# Patient Record
Sex: MALE | Race: WHITE | Employment: FULL TIME | URBAN - METROPOLITAN AREA
[De-identification: names, ages, dates, MRNs, and addresses within clinical notes are randomized per-mention and may not be internally consistent; named-entity substitution may affect disease eponyms.]

---

## 2017-01-17 ENCOUNTER — OFFICE VISIT (OUTPATIENT)
Dept: INTERNAL MEDICINE CLINIC | Age: 52
End: 2017-01-17

## 2017-01-17 VITALS
BODY MASS INDEX: 34.24 KG/M2 | HEART RATE: 60 BPM | DIASTOLIC BLOOD PRESSURE: 75 MMHG | WEIGHT: 252.8 LBS | HEIGHT: 72 IN | TEMPERATURE: 98.5 F | SYSTOLIC BLOOD PRESSURE: 104 MMHG

## 2017-01-17 DIAGNOSIS — Z00.00 PREVENTATIVE HEALTH CARE: Primary | ICD-10-CM

## 2017-01-17 DIAGNOSIS — F33.1 MODERATE EPISODE OF RECURRENT MAJOR DEPRESSIVE DISORDER (HCC): ICD-10-CM

## 2017-01-17 DIAGNOSIS — E66.09 NON MORBID OBESITY DUE TO EXCESS CALORIES: ICD-10-CM

## 2017-01-17 DIAGNOSIS — Z00.00 PREVENTATIVE HEALTH CARE: ICD-10-CM

## 2017-01-17 LAB
CHOLESTEROL, TOTAL: 199 MG/DL (ref 0–199)
HDLC SERPL-MCNC: 34 MG/DL (ref 40–60)
HEPATITIS C ANTIBODY INTERPRETATION: NORMAL
LDL CHOLESTEROL CALCULATED: 144 MG/DL
T4 FREE: 1.2 NG/DL (ref 0.9–1.8)
TRIGL SERPL-MCNC: 106 MG/DL (ref 0–150)
TSH SERPL DL<=0.05 MIU/L-ACNC: 2.22 UIU/ML (ref 0.27–4.2)
VLDLC SERPL CALC-MCNC: 21 MG/DL

## 2017-01-17 PROCEDURE — 99386 PREV VISIT NEW AGE 40-64: CPT | Performed by: INTERNAL MEDICINE

## 2017-01-17 RX ORDER — CALCIUM CARBONATE 200(500)MG
1 TABLET,CHEWABLE ORAL 2 TIMES DAILY WITH MEALS
Qty: 180 TABLET | Refills: 5 | Status: SHIPPED | OUTPATIENT
Start: 2017-01-17 | End: 2018-02-14 | Stop reason: SDUPTHER

## 2017-01-17 RX ORDER — ALPRAZOLAM 1 MG/1
1 TABLET ORAL NIGHTLY PRN
Qty: 25 TABLET | Refills: 0 | Status: SHIPPED | OUTPATIENT
Start: 2017-01-17 | End: 2018-10-30

## 2017-01-17 ASSESSMENT — ENCOUNTER SYMPTOMS
RESPIRATORY NEGATIVE: 1
GASTROINTESTINAL NEGATIVE: 1
EYES NEGATIVE: 1
ALLERGIC/IMMUNOLOGIC NEGATIVE: 1

## 2017-01-18 LAB
ESTIMATED AVERAGE GLUCOSE: 108.3 MG/DL
HBA1C MFR BLD: 5.4 %

## 2017-02-02 ENCOUNTER — TELEPHONE (OUTPATIENT)
Dept: INTERNAL MEDICINE CLINIC | Age: 52
End: 2017-02-02

## 2017-02-08 ENCOUNTER — OFFICE VISIT (OUTPATIENT)
Dept: INTERNAL MEDICINE CLINIC | Age: 52
End: 2017-02-08

## 2017-02-08 VITALS — HEART RATE: 56 BPM | DIASTOLIC BLOOD PRESSURE: 66 MMHG | TEMPERATURE: 98 F | SYSTOLIC BLOOD PRESSURE: 122 MMHG

## 2017-02-08 DIAGNOSIS — H91.21 SUDDEN HEARING LOSS, RIGHT: ICD-10-CM

## 2017-02-08 DIAGNOSIS — Z00.00 PREVENTATIVE HEALTH CARE: ICD-10-CM

## 2017-02-08 DIAGNOSIS — S03.00XA TMJ (DISLOCATION OF TEMPOROMANDIBULAR JOINT), INITIAL ENCOUNTER: Primary | ICD-10-CM

## 2017-02-08 PROCEDURE — 99213 OFFICE O/P EST LOW 20 MIN: CPT | Performed by: INTERNAL MEDICINE

## 2017-02-08 RX ORDER — PREDNISONE 20 MG/1
60 TABLET ORAL DAILY
Qty: 30 TABLET | Refills: 0 | Status: SHIPPED | OUTPATIENT
Start: 2017-02-08 | End: 2017-02-18

## 2017-02-08 RX ORDER — CYCLOBENZAPRINE HCL 10 MG
10 TABLET ORAL NIGHTLY
Qty: 10 TABLET | Refills: 0 | Status: SHIPPED | OUTPATIENT
Start: 2017-02-08 | End: 2017-02-18

## 2017-02-08 RX ORDER — IBUPROFEN 800 MG/1
800 TABLET ORAL EVERY 8 HOURS PRN
Qty: 60 TABLET | Refills: 1 | Status: SHIPPED | OUTPATIENT
Start: 2017-02-08 | End: 2017-04-21 | Stop reason: SDUPTHER

## 2017-02-08 ASSESSMENT — ENCOUNTER SYMPTOMS
ABDOMINAL PAIN: 0
DIARRHEA: 0
VOMITING: 0
SORE THROAT: 0
COUGH: 0
RHINORRHEA: 0

## 2017-02-13 ENCOUNTER — NURSE ONLY (OUTPATIENT)
Dept: INTERNAL MEDICINE CLINIC | Age: 52
End: 2017-02-13

## 2017-02-13 DIAGNOSIS — Z00.00 PREVENTATIVE HEALTH CARE: ICD-10-CM

## 2017-02-13 LAB
CONTROL: NORMAL
HEMOCCULT STL QL: NORMAL

## 2017-02-13 PROCEDURE — 82274 ASSAY TEST FOR BLOOD FECAL: CPT | Performed by: INTERNAL MEDICINE

## 2017-04-24 RX ORDER — IBUPROFEN 800 MG/1
TABLET ORAL
Qty: 60 TABLET | Refills: 1 | Status: SHIPPED | OUTPATIENT
Start: 2017-04-24 | End: 2021-12-09

## 2018-01-15 RX ORDER — MELATONIN
1000 DAILY
Qty: 90 TABLET | Refills: 3 | Status: SHIPPED | OUTPATIENT
Start: 2018-01-15 | End: 2018-12-10 | Stop reason: SDUPTHER

## 2018-02-14 RX ORDER — ANTACID TABLETS 500 MG/1
TABLET, CHEWABLE ORAL
Qty: 180 TABLET | Refills: 4 | Status: ON HOLD | OUTPATIENT
Start: 2018-02-14 | End: 2019-02-01

## 2018-08-21 ENCOUNTER — OFFICE VISIT (OUTPATIENT)
Dept: INTERNAL MEDICINE CLINIC | Age: 53
End: 2018-08-21

## 2018-08-21 VITALS
OXYGEN SATURATION: 100 % | HEIGHT: 72 IN | SYSTOLIC BLOOD PRESSURE: 131 MMHG | WEIGHT: 219.4 LBS | BODY MASS INDEX: 29.72 KG/M2 | HEART RATE: 90 BPM | DIASTOLIC BLOOD PRESSURE: 88 MMHG

## 2018-08-21 DIAGNOSIS — M54.12 CERVICAL RADICULOPATHY: Primary | ICD-10-CM

## 2018-08-21 PROCEDURE — 99213 OFFICE O/P EST LOW 20 MIN: CPT | Performed by: NURSE PRACTITIONER

## 2018-08-21 RX ORDER — METHYLPREDNISOLONE 4 MG/1
TABLET ORAL
Qty: 1 KIT | Refills: 0 | Status: SHIPPED | OUTPATIENT
Start: 2018-08-21 | End: 2018-08-27

## 2018-08-21 RX ORDER — CYCLOBENZAPRINE HCL 10 MG
10 TABLET ORAL 3 TIMES DAILY PRN
Qty: 30 TABLET | Refills: 0 | Status: SHIPPED | OUTPATIENT
Start: 2018-08-21 | End: 2018-08-31

## 2018-08-21 ASSESSMENT — ENCOUNTER SYMPTOMS: BACK PAIN: 1

## 2018-08-21 NOTE — PROGRESS NOTES
Subjective:      Patient ID: Sukh Olson is a 46 y.o. male. HPI 45 yo male male presents to clinic for c/o possible pinched nerve to right shoulder. Has tried advil 400 mg  and heating pad with no relief. Pain with ROM. Pain also at rest. Rates pain 3-4/10 at rest. Has job that involves a lot of overhead lifting and motions. Has not noted any swelling. Nothing makes It better. worse with movement    . Patient past medical history, family history, social, and smoking reviewed and updated as pertinent. Health maintenance has been reviewed. Prior to Visit Medications    Medication Sig Taking? Authorizing Provider   methylPREDNISolone (MEDROL DOSEPACK) 4 MG tablet Take by mouth. Yes KARINA Marcano CNP   cyclobenzaprine (FLEXERIL) 10 MG tablet Take 1 tablet by mouth 3 times daily as needed for Muscle spasms Yes KARINA Marcano CNP   MARIUSZ-GEST ANTACID 500 MG chewable tablet CHEW 1 TABLET WITH MEALS TWICE A DAY Yes Roberto Gifford MD   Cholecalciferol (VITAMIN D3) 1000 units TABS TAKE 1 TABLET BY MOUTH DAILY Yes Roberto Gifford MD   ibuprofen (ADVIL;MOTRIN) 800 MG tablet TAKE 1 TABLET BY MOUTH EVERY 8 HOURS AS NEEDED FOR PAIN TAKE WITH FOOD. Roberto Gifford MD   ALPRAZolam Royetta Field) 1 MG tablet Take 1 tablet by mouth nightly as needed for Sleep  Roberto Gifford MD   clonazePAM (KLONOPIN) 0.5 MG tablet Take 1 tablet by mouth 2 times daily as needed for Anxiety. Brisa Paul MD         Review of Systems   Musculoskeletal: Positive for back pain and neck pain. All other systems reviewed and are negative. Objective:   Physical Exam   Constitutional: He is oriented to person, place, and time. He appears well-developed and well-nourished. Eyes: Pupils are equal, round, and reactive to light. Cardiovascular: Normal rate, regular rhythm and normal heart sounds.     Pulmonary/Chest: Effort normal and breath sounds normal.   Neurological: He is alert and oriented to person, place, and

## 2018-10-30 ENCOUNTER — OFFICE VISIT (OUTPATIENT)
Dept: INTERNAL MEDICINE CLINIC | Age: 53
End: 2018-10-30
Payer: COMMERCIAL

## 2018-10-30 VITALS
HEART RATE: 88 BPM | BODY MASS INDEX: 29.4 KG/M2 | HEIGHT: 71 IN | SYSTOLIC BLOOD PRESSURE: 100 MMHG | WEIGHT: 210 LBS | RESPIRATION RATE: 16 BRPM | DIASTOLIC BLOOD PRESSURE: 60 MMHG

## 2018-10-30 DIAGNOSIS — Z23 NEED FOR PROPHYLACTIC VACCINATION AGAINST DIPHTHERIA-TETANUS-PERTUSSIS (DTP): ICD-10-CM

## 2018-10-30 DIAGNOSIS — Z12.11 SCREENING FOR COLON CANCER: ICD-10-CM

## 2018-10-30 DIAGNOSIS — E66.3 OVERWEIGHT (BMI 25.0-29.9): ICD-10-CM

## 2018-10-30 DIAGNOSIS — F41.8 OTHER SPECIFIED ANXIETY DISORDERS: Primary | ICD-10-CM

## 2018-10-30 DIAGNOSIS — E78.5 DYSLIPIDEMIA (HIGH LDL; LOW HDL): ICD-10-CM

## 2018-10-30 DIAGNOSIS — Z23 NEED FOR PROPHYLACTIC VACCINATION AND INOCULATION AGAINST VARICELLA: ICD-10-CM

## 2018-10-30 PROBLEM — E66.09 NON MORBID OBESITY DUE TO EXCESS CALORIES: Status: RESOLVED | Noted: 2017-01-17 | Resolved: 2018-10-30

## 2018-10-30 PROCEDURE — 99214 OFFICE O/P EST MOD 30 MIN: CPT | Performed by: INTERNAL MEDICINE

## 2018-10-30 ASSESSMENT — PATIENT HEALTH QUESTIONNAIRE - PHQ9
SUM OF ALL RESPONSES TO PHQ9 QUESTIONS 1 & 2: 0
SUM OF ALL RESPONSES TO PHQ QUESTIONS 1-9: 0
SUM OF ALL RESPONSES TO PHQ QUESTIONS 1-9: 0
2. FEELING DOWN, DEPRESSED OR HOPELESS: 0
1. LITTLE INTEREST OR PLEASURE IN DOING THINGS: 0

## 2018-10-30 NOTE — PATIENT INSTRUCTIONS
social groups, or volunteer to help others. Being alone sometimes makes things seem worse than they are. · Get at least 30 minutes of exercise on most days of the week to relieve stress. Walking is a good choice. You also may want to do other activities, such as running, swimming, cycling, or playing tennis or team sports. Relaxation techniques  Do relaxation exercises 10 to 20 minutes a day. You can play soothing, relaxing music while you do them, if you wish. · Tell others in your house that you are going to do your relaxation exercises. Ask them not to disturb you. · Find a comfortable place, away from all distractions and noise. · Lie down on your back, or sit with your back straight. · Focus on your breathing. Make it slow and steady. · Breathe in through your nose. Breathe out through either your nose or mouth. · Breathe deeply, filling up the area between your navel and your rib cage. Breathe so that your belly goes up and down. · Do not hold your breath. · Breathe like this for 5 to 10 minutes. Notice the feeling of calmness throughout your whole body. As you continue to breathe slowly and deeply, relax by doing the following for another 5 to 10 minutes:  · Tighten and relax each muscle group in your body. You can begin at your toes and work your way up to your head. · Imagine your muscle groups relaxing and becoming heavy. · Empty your mind of all thoughts. · Let yourself relax more and more deeply. · Become aware of the state of calmness that surrounds you. · When your relaxation time is over, you can bring yourself back to alertness by moving your fingers and toes and then your hands and feet and then stretching and moving your entire body. Sometimes people fall asleep during relaxation, but they usually wake up shortly afterward. · Always give yourself time to return to full alertness before you drive a car or do anything that might cause an accident if you are not fully alert.  Never play a relaxation tape while you drive a car. When should you call for help? Call 911 anytime you think you may need emergency care. For example, call if:    · You feel you cannot stop from hurting yourself or someone else.   Katie Moeller the numbers for these national suicide hotlines: 1-362-912-TALK (2-371.787.9800) and 8-946-JKFNVPJ (4-304.461.7842). If you or someone you know talks about suicide or feeling hopeless, get help right away.   Watch closely for changes in your health, and be sure to contact your doctor if:    · You have anxiety or fear that affects your life.     · You have symptoms of anxiety that are new or different from those you had before. Where can you learn more? Go to https://[email or web address]. org and sign in to your [employer] account. Enter P754 in the eROI box to learn more about \"Anxiety Disorder: Care Instructions. \"     If you do not have an account, please click on the \"Sign Up Now\" link. Current as of: December 7, 2017  Content Version: 11.7  © 7470-1236 U[employer].. Care instructions adapted under license by [employer] ([facility]). If you have questions about a medical condition or this instruction, always ask your healthcare professional. [address] any warranty or liability for your use of this information. Patient Education            Current as of: March 28, 2018  Content Version: 11.7  © 9437-3214 UB.. Care instructions adapted under license by [employer] ([facility]). If you have questions about a medical condition or this instruction, always ask your healthcare professional. [address] any warranty or liability for your use of this information. Patient Education        Colon Cancer Screening: Care Instructions  Your Care Instructions    Colorectal cancer occurs in the colon or rectum. That's the lower part of your digestive system.  It is the second-leading cause of cancer deaths

## 2018-10-30 NOTE — PROGRESS NOTES
PROGRESS NOTE:    Rozina Pickard    10/30/2018    Chief Complaint   Patient presents with   Zena Vincent Patient     Former Dr. Arjun Marti patient   [facility] Maintenance     Patient advised of need for flu vaccine, declines previous allergic reaction to vaccine. HPI:    Mr(s)Octaviano Pickard presents to clinic today with issues noted above. He does have some anxiety but no more than normal reaction to common stressors such as finances (i.e. Buying a condo\". Been taking vitamin D, he does not know why he is taking it. Also placed on Tums twice daily by Arjun Marti, he does not know why he is taking it. Patient denies chest pain, SOB, NVD, FC, rash, malaise, rigor, dizziness/lightheadness, other pertinent ROS was also reviewed. /60 (Site: Right Upper Arm, Position: Sitting, Cuff Size: Large Adult)   Pulse 88 Comment: Regular  Resp 16   Ht 5' 11\" (1.803 m)   Wt 210 lb (95.3 kg)   BMI 29.29 kg/m²   Body mass index is 29.29 kg/m². No Known Allergies    Physical Exam:    Gen: Patient appears well groomed, well appearing  HEAD: Atraumatic, normocephalic,   Eyes: PERRLA, EOMI   Neck: supple, no thyroid nodule appreciated, no JVD  Chest: Clear to auscultation SENIA, unlabored breathing, normal expansion  Heart: Regular rate, regular rhythm, no murmur, no rub  Abdomen: Non-tender, non-distended, bowel sounds present x3  Extremities: no edema, distal pulses intact  Patient was alert and oriented to person, place and time  Psych:     Assessment and Plan:    Paco Reed was seen today for new patient and health maintenance. Diagnoses and all orders for this visit:    Other specified anxiety disorders  He is doing well overall without meds, interested in talking with psychology    Overweight (BMI 25.0-29. 9)  He is currently on keto diet, I advised him to keep a balanced diet instead, has lost >25lbs, continue diet/exercise mods  -     Lipid, Fasting;  Future    Dyslipidemia (high LDL; low

## 2018-12-10 RX ORDER — MELATONIN
1000 DAILY
Qty: 90 TABLET | Refills: 1 | Status: SHIPPED | OUTPATIENT
Start: 2018-12-10 | End: 2019-03-08 | Stop reason: SDUPTHER

## 2019-01-14 ENCOUNTER — TELEPHONE (OUTPATIENT)
Dept: SURGERY | Age: 54
End: 2019-01-14

## 2019-01-15 ENCOUNTER — ANESTHESIA EVENT (OUTPATIENT)
Dept: ENDOSCOPY | Age: 54
End: 2019-01-15
Payer: COMMERCIAL

## 2019-01-21 ENCOUNTER — OFFICE VISIT (OUTPATIENT)
Dept: INTERNAL MEDICINE CLINIC | Age: 54
End: 2019-01-21
Payer: COMMERCIAL

## 2019-01-21 VITALS
DIASTOLIC BLOOD PRESSURE: 60 MMHG | SYSTOLIC BLOOD PRESSURE: 110 MMHG | HEIGHT: 72 IN | WEIGHT: 205 LBS | TEMPERATURE: 98.3 F | RESPIRATION RATE: 16 BRPM | BODY MASS INDEX: 27.77 KG/M2 | HEART RATE: 68 BPM

## 2019-01-21 DIAGNOSIS — J00 NASOPHARYNGITIS: Primary | ICD-10-CM

## 2019-01-21 DIAGNOSIS — H65.01 RIGHT ACUTE SEROUS OTITIS MEDIA, RECURRENCE NOT SPECIFIED: ICD-10-CM

## 2019-01-21 PROCEDURE — 99213 OFFICE O/P EST LOW 20 MIN: CPT | Performed by: INTERNAL MEDICINE

## 2019-01-21 RX ORDER — PREDNISONE 10 MG/1
20 TABLET ORAL DAILY
Qty: 10 TABLET | Refills: 0 | Status: SHIPPED | OUTPATIENT
Start: 2019-01-21 | End: 2019-01-26

## 2019-01-21 RX ORDER — PROMETHAZINE HYDROCHLORIDE AND PHENYLEPHRINE HYDROCHLORIDE 6.25; 5 MG/5ML; MG/5ML
5 SYRUP ORAL EVERY 6 HOURS
Qty: 280 ML | Refills: 0 | Status: SHIPPED | OUTPATIENT
Start: 2019-01-21 | End: 2019-11-11 | Stop reason: ALTCHOICE

## 2019-01-21 RX ORDER — CETIRIZINE HYDROCHLORIDE 10 MG/1
10 TABLET ORAL DAILY
Qty: 30 TABLET | Refills: 0 | Status: SHIPPED | OUTPATIENT
Start: 2019-01-21 | End: 2019-02-17 | Stop reason: SDUPTHER

## 2019-01-24 ENCOUNTER — TELEPHONE (OUTPATIENT)
Dept: INTERNAL MEDICINE CLINIC | Age: 54
End: 2019-01-24

## 2019-01-25 ENCOUNTER — OFFICE VISIT (OUTPATIENT)
Dept: INTERNAL MEDICINE CLINIC | Age: 54
End: 2019-01-25
Payer: COMMERCIAL

## 2019-01-25 VITALS
DIASTOLIC BLOOD PRESSURE: 60 MMHG | OXYGEN SATURATION: 100 % | WEIGHT: 210 LBS | BODY MASS INDEX: 28.48 KG/M2 | SYSTOLIC BLOOD PRESSURE: 115 MMHG | HEART RATE: 80 BPM | TEMPERATURE: 98.4 F

## 2019-01-25 DIAGNOSIS — Z12.11 SCREENING FOR COLON CANCER: Primary | ICD-10-CM

## 2019-01-25 DIAGNOSIS — H66.90 ACUTE OTITIS MEDIA, UNSPECIFIED OTITIS MEDIA TYPE: ICD-10-CM

## 2019-01-25 DIAGNOSIS — J01.00 ACUTE MAXILLARY SINUSITIS, RECURRENCE NOT SPECIFIED: Primary | ICD-10-CM

## 2019-01-25 PROCEDURE — 99213 OFFICE O/P EST LOW 20 MIN: CPT | Performed by: INTERNAL MEDICINE

## 2019-01-25 RX ORDER — AMOXICILLIN AND CLAVULANATE POTASSIUM 875; 125 MG/1; MG/1
1 TABLET, FILM COATED ORAL 2 TIMES DAILY
Qty: 20 TABLET | Refills: 0 | Status: SHIPPED | OUTPATIENT
Start: 2019-01-25 | End: 2019-02-04

## 2019-01-25 RX ORDER — ECHINACEA PURPUREA EXTRACT 125 MG
3 TABLET ORAL 3 TIMES DAILY
Qty: 1 BOTTLE | Refills: 3 | Status: ON HOLD | COMMUNITY
Start: 2019-01-25 | End: 2021-04-20 | Stop reason: HOSPADM

## 2019-01-25 RX ORDER — TRIAMCINOLONE ACETONIDE 55 UG/1
2 SPRAY, METERED NASAL NIGHTLY
Qty: 1 INHALER | Refills: 0 | Status: ON HOLD | COMMUNITY
Start: 2019-01-25 | End: 2021-04-20 | Stop reason: HOSPADM

## 2019-01-29 ENCOUNTER — TELEPHONE (OUTPATIENT)
Dept: SURGERY | Age: 54
End: 2019-01-29

## 2019-01-30 ENCOUNTER — TELEPHONE (OUTPATIENT)
Dept: SURGERY | Age: 54
End: 2019-01-30

## 2019-02-01 ENCOUNTER — HOSPITAL ENCOUNTER (OUTPATIENT)
Age: 54
Setting detail: OUTPATIENT SURGERY
Discharge: HOME | End: 2019-02-01
Attending: SURGERY | Admitting: SURGERY
Payer: COMMERCIAL

## 2019-02-01 ENCOUNTER — ANESTHESIA (OUTPATIENT)
Dept: ENDOSCOPY | Age: 54
End: 2019-02-01
Payer: COMMERCIAL

## 2019-02-01 VITALS
WEIGHT: 205 LBS | HEART RATE: 73 BPM | DIASTOLIC BLOOD PRESSURE: 78 MMHG | BODY MASS INDEX: 27.77 KG/M2 | SYSTOLIC BLOOD PRESSURE: 119 MMHG | OXYGEN SATURATION: 100 % | HEIGHT: 72 IN | RESPIRATION RATE: 14 BRPM | TEMPERATURE: 98.8 F

## 2019-02-01 VITALS
SYSTOLIC BLOOD PRESSURE: 143 MMHG | DIASTOLIC BLOOD PRESSURE: 85 MMHG | RESPIRATION RATE: 18 BRPM | OXYGEN SATURATION: 100 %

## 2019-02-01 PROCEDURE — 2500000003 HC RX 250 WO HCPCS: Performed by: NURSE ANESTHETIST, CERTIFIED REGISTERED

## 2019-02-01 PROCEDURE — 3700000000 HC ANESTHESIA ATTENDED CARE: Performed by: SURGERY

## 2019-02-01 PROCEDURE — 3609027000 HC COLONOSCOPY: Performed by: SURGERY

## 2019-02-01 PROCEDURE — 7100000000 HC PACU RECOVERY - FIRST 15 MIN: Performed by: SURGERY

## 2019-02-01 PROCEDURE — 7100000010 HC PHASE II RECOVERY - FIRST 15 MIN: Performed by: SURGERY

## 2019-02-01 PROCEDURE — 6360000002 HC RX W HCPCS: Performed by: NURSE ANESTHETIST, CERTIFIED REGISTERED

## 2019-02-01 PROCEDURE — 6360000002 HC RX W HCPCS: Performed by: ANESTHESIOLOGY

## 2019-02-01 PROCEDURE — 3700000001 HC ADD 15 MINUTES (ANESTHESIA): Performed by: SURGERY

## 2019-02-01 PROCEDURE — 7100000011 HC PHASE II RECOVERY - ADDTL 15 MIN: Performed by: SURGERY

## 2019-02-01 PROCEDURE — 2580000003 HC RX 258: Performed by: ANESTHESIOLOGY

## 2019-02-01 PROCEDURE — 45378 DIAGNOSTIC COLONOSCOPY: CPT | Performed by: SURGERY

## 2019-02-01 PROCEDURE — 2709999900 HC NON-CHARGEABLE SUPPLY: Performed by: SURGERY

## 2019-02-01 PROCEDURE — 7100000001 HC PACU RECOVERY - ADDTL 15 MIN: Performed by: SURGERY

## 2019-02-01 RX ORDER — SODIUM CHLORIDE 0.9 % (FLUSH) 0.9 %
10 SYRINGE (ML) INJECTION PRN
Status: DISCONTINUED | OUTPATIENT
Start: 2019-02-01 | End: 2019-02-01 | Stop reason: HOSPADM

## 2019-02-01 RX ORDER — LIDOCAINE HYDROCHLORIDE 10 MG/ML
1 INJECTION, SOLUTION EPIDURAL; INFILTRATION; INTRACAUDAL; PERINEURAL
Status: DISCONTINUED | OUTPATIENT
Start: 2019-02-01 | End: 2019-02-01 | Stop reason: HOSPADM

## 2019-02-01 RX ORDER — M-VIT,TX,IRON,MINS/CALC/FOLIC 27MG-0.4MG
1 TABLET ORAL DAILY
COMMUNITY

## 2019-02-01 RX ORDER — SODIUM CHLORIDE 0.9 % (FLUSH) 0.9 %
10 SYRINGE (ML) INJECTION EVERY 12 HOURS SCHEDULED
Status: DISCONTINUED | OUTPATIENT
Start: 2019-02-01 | End: 2019-02-01 | Stop reason: HOSPADM

## 2019-02-01 RX ORDER — DIPHENHYDRAMINE HYDROCHLORIDE 50 MG/ML
12.5 INJECTION INTRAMUSCULAR; INTRAVENOUS ONCE
Status: COMPLETED | OUTPATIENT
Start: 2019-02-01 | End: 2019-02-01

## 2019-02-01 RX ORDER — DIPHENHYDRAMINE HYDROCHLORIDE 50 MG/ML
INJECTION INTRAMUSCULAR; INTRAVENOUS
Status: DISCONTINUED
Start: 2019-02-01 | End: 2019-02-01 | Stop reason: HOSPADM

## 2019-02-01 RX ORDER — LIDOCAINE HYDROCHLORIDE 20 MG/ML
INJECTION, SOLUTION INFILTRATION; PERINEURAL PRN
Status: DISCONTINUED | OUTPATIENT
Start: 2019-02-01 | End: 2019-02-01 | Stop reason: SDUPTHER

## 2019-02-01 RX ORDER — CHLORAL HYDRATE 500 MG
3000 CAPSULE ORAL DAILY
COMMUNITY

## 2019-02-01 RX ORDER — KETAMINE HYDROCHLORIDE 10 MG/ML
INJECTION, SOLUTION INTRAMUSCULAR; INTRAVENOUS PRN
Status: DISCONTINUED | OUTPATIENT
Start: 2019-02-01 | End: 2019-02-01 | Stop reason: SDUPTHER

## 2019-02-01 RX ORDER — LANOLIN ALCOHOL/MO/W.PET/CERES
250 CREAM (GRAM) TOPICAL NIGHTLY
COMMUNITY

## 2019-02-01 RX ORDER — SODIUM CHLORIDE 9 MG/ML
INJECTION, SOLUTION INTRAVENOUS CONTINUOUS
Status: DISCONTINUED | OUTPATIENT
Start: 2019-02-01 | End: 2019-02-01 | Stop reason: HOSPADM

## 2019-02-01 RX ORDER — PROPOFOL 10 MG/ML
INJECTION, EMULSION INTRAVENOUS PRN
Status: DISCONTINUED | OUTPATIENT
Start: 2019-02-01 | End: 2019-02-01 | Stop reason: SDUPTHER

## 2019-02-01 RX ADMIN — LIDOCAINE HYDROCHLORIDE 50 MG: 20 INJECTION, SOLUTION INFILTRATION; PERINEURAL at 08:14

## 2019-02-01 RX ADMIN — SODIUM CHLORIDE: 9 INJECTION, SOLUTION INTRAVENOUS at 06:59

## 2019-02-01 RX ADMIN — PROPOFOL 30 MG: 10 INJECTION, EMULSION INTRAVENOUS at 08:21

## 2019-02-01 RX ADMIN — PROPOFOL 20 MG: 10 INJECTION, EMULSION INTRAVENOUS at 08:17

## 2019-02-01 RX ADMIN — PROPOFOL 30 MG: 10 INJECTION, EMULSION INTRAVENOUS at 08:14

## 2019-02-01 RX ADMIN — KETAMINE HYDROCHLORIDE 15 MG: 10 INJECTION, SOLUTION INTRAMUSCULAR; INTRAVENOUS at 08:18

## 2019-02-01 RX ADMIN — PROPOFOL 20 MG: 10 INJECTION, EMULSION INTRAVENOUS at 08:23

## 2019-02-01 RX ADMIN — PROPOFOL 50 MG: 10 INJECTION, EMULSION INTRAVENOUS at 08:19

## 2019-02-01 RX ADMIN — DIPHENHYDRAMINE HYDROCHLORIDE 12.5 MG: 50 INJECTION, SOLUTION INTRAMUSCULAR; INTRAVENOUS at 08:48

## 2019-02-01 RX ADMIN — KETAMINE HYDROCHLORIDE 15 MG: 10 INJECTION, SOLUTION INTRAMUSCULAR; INTRAVENOUS at 08:14

## 2019-02-01 ASSESSMENT — PULMONARY FUNCTION TESTS
PIF_VALUE: 0

## 2019-02-01 ASSESSMENT — PAIN - FUNCTIONAL ASSESSMENT: PAIN_FUNCTIONAL_ASSESSMENT: 0-10

## 2019-02-19 RX ORDER — CETIRIZINE HYDROCHLORIDE 10 MG/1
TABLET ORAL
Qty: 30 TABLET | Refills: 0 | Status: SHIPPED | OUTPATIENT
Start: 2019-02-19 | End: 2019-03-19 | Stop reason: SDUPTHER

## 2019-03-08 RX ORDER — MELATONIN
1000 DAILY
Qty: 90 TABLET | Refills: 1 | Status: SHIPPED | OUTPATIENT
Start: 2019-03-08 | End: 2019-09-19 | Stop reason: SDUPTHER

## 2019-03-20 RX ORDER — CETIRIZINE HYDROCHLORIDE 10 MG/1
TABLET ORAL
Qty: 90 TABLET | Refills: 0 | Status: ON HOLD | OUTPATIENT
Start: 2019-03-20 | End: 2021-04-20 | Stop reason: HOSPADM

## 2019-09-19 ENCOUNTER — TELEPHONE (OUTPATIENT)
Dept: INTERNAL MEDICINE CLINIC | Age: 54
End: 2019-09-19

## 2019-09-19 RX ORDER — MELATONIN
1000 DAILY
Qty: 90 TABLET | Refills: 0 | Status: SHIPPED | OUTPATIENT
Start: 2019-09-19

## 2019-09-19 NOTE — TELEPHONE ENCOUNTER
Patient requesting refill for Cholecalciferol (VITAMIN D3) 1000 units TABS #90.   Please send to pharmacy: [id number] [facility], [address] [place] [address]

## 2019-09-19 NOTE — TELEPHONE ENCOUNTER
Last appointment: 1/25/19 - urgent visit   Next appointment: 11/11/2019  Last refill: 3/8/19     Please advise as DOD  Thank you

## 2019-11-11 ENCOUNTER — OFFICE VISIT (OUTPATIENT)
Dept: INTERNAL MEDICINE CLINIC | Age: 54
End: 2019-11-11
Payer: COMMERCIAL

## 2019-11-11 VITALS
HEART RATE: 77 BPM | SYSTOLIC BLOOD PRESSURE: 102 MMHG | OXYGEN SATURATION: 99 % | WEIGHT: 212.8 LBS | BODY MASS INDEX: 28.86 KG/M2 | RESPIRATION RATE: 14 BRPM | DIASTOLIC BLOOD PRESSURE: 66 MMHG

## 2019-11-11 DIAGNOSIS — Z13.1 SCREENING FOR DIABETES MELLITUS (DM): ICD-10-CM

## 2019-11-11 DIAGNOSIS — Z13.220 LIPID SCREENING: ICD-10-CM

## 2019-11-11 DIAGNOSIS — Z00.00 ANNUAL PHYSICAL EXAM: Primary | ICD-10-CM

## 2019-11-11 DIAGNOSIS — Z23 NEED FOR TDAP VACCINATION: ICD-10-CM

## 2019-11-11 PROCEDURE — 99396 PREV VISIT EST AGE 40-64: CPT | Performed by: INTERNAL MEDICINE

## 2020-03-30 ENCOUNTER — VIRTUAL VISIT (OUTPATIENT)
Dept: INTERNAL MEDICINE CLINIC | Age: 55
End: 2020-03-30
Payer: COMMERCIAL

## 2020-03-30 ENCOUNTER — TELEPHONE (OUTPATIENT)
Dept: INTERNAL MEDICINE CLINIC | Age: 55
End: 2020-03-30

## 2020-03-30 PROCEDURE — 99214 OFFICE O/P EST MOD 30 MIN: CPT | Performed by: INTERNAL MEDICINE

## 2020-03-30 ASSESSMENT — ENCOUNTER SYMPTOMS
SHORTNESS OF BREATH: 1
DIARRHEA: 0
SORE THROAT: 0
RHINORRHEA: 1
VOMITING: 0
COUGH: 0
NAUSEA: 0
CONSTIPATION: 0
ABDOMINAL PAIN: 1
WHEEZING: 0
SINUS PAIN: 1
SINUS PRESSURE: 1

## 2020-03-31 NOTE — PATIENT INSTRUCTIONS
I have some more up to date information from the [facility] about discontinuing self isolation that is relevant for you. Here is what we are looking for to state that risk of secondary transmission is low -      1. No fever for at least 72 hours without medications  2. Symptoms have improved/resolved  AND  3.  At least 7 days from initial symptom onset
no diarrhea/no change in bowel habits/no hiccoughs/no vomiting/no nausea

## 2020-03-31 NOTE — PROGRESS NOTES
extended release capsule Take 250 mg by mouth nightly  Historical Provider, MD   Multiple Vitamins-Minerals (THERAPEUTIC MULTIVITAMIN-MINERALS) tablet Take 1 tablet by mouth daily  Historical Provider, MD   Omega-3 Fatty Acids (FISH OIL) 1000 MG CAPS Take 3,000 mg by mouth daily  Historical Provider, MD   triamcinolone (NASACORT ALLERGY 24HR) 55 MCG/ACT nasal inhaler 2 sprays by Nasal route nightly  Derrick Olivo DO   sodium chloride (OCEAN) 0.65 % nasal spray 3 sprays by Nasal route three times daily  Derrick Olivo DO   ibuprofen (ADVIL;MOTRIN) 800 MG tablet TAKE 1 TABLET BY MOUTH EVERY 8 HOURS AS NEEDED FOR PAIN TAKE WITH FOOD.   Belinda Oleary MD       Social History     Tobacco Use    Smoking status: Never Smoker    Smokeless tobacco: Never Used    Tobacco comment: non smoker   Substance Use Topics    Alcohol use: No    Drug use: No        No Known Allergies,   Past Medical History:   Diagnosis Date    Anxiety     Chest pain     Depression     PVC (premature ventricular contraction)    ,   Past Surgical History:   Procedure Laterality Date    COLONOSCOPY N/A 2/1/2019    COLONOSCOPY performed by Sita Munoz MD at [address] [place] [address]   ,   Social History     Tobacco Use    Smoking status: Never Smoker    Smokeless tobacco: Never Used    Tobacco comment: non smoker   Substance Use Topics    Alcohol use: No    Drug use: No   ,   Family History   Problem Relation Age of Onset    Breast Cancer Mother     Diabetes Father     Diabetes Maternal Grandmother     Prostate Cancer Neg Hx        PHYSICAL EXAMINATION:  [ INSTRUCTIONS:  \"[x]\" Indicates a positive item  \"[]\" Indicates a negative item  -- DELETE ALL ITEMS NOT EXAMINED]  Vital Signs: (As obtained by patient/caregiver or practitioner observation)    Blood pressure-  Heart rate-    Respiratory rate-    Temperature-  Pulse oximetry-     Constitutional: [x] Appears well-developed and well-nourished [x] No apparent distress      [] Abnormal- Mental status  [x] Alert and awake  [x] Oriented to person/place/time [x]Able to follow commands      Eyes:  EOM    [x]  Normal  [] Abnormal-  Sclera  [x]  Normal  [] Abnormal -         Discharge [x]  None visible  [] Abnormal -    HENT:   [x] Normocephalic, atraumatic. [] Abnormal   [x] Mouth/Throat: Mucous membranes are moist.     External Ears [x] Normal  [] Abnormal-     Neck: [x] No visualized mass     Pulmonary/Chest: [x] Respiratory effort normal.  [x] No visualized signs of difficulty breathing or respiratory distress        [] Abnormal-      Musculoskeletal:   [x] Normal gait with no signs of ataxia         [x] Normal range of motion of neck        [] Abnormal-       Neurological:        [x] No Facial Asymmetry (Cranial nerve 7 motor function) (limited exam to video visit)          [x] No gaze palsy        [] Abnormal-         Skin:        [x] No significant exanthematous lesions or discoloration noted on facial skin         [] Abnormal-             Psychiatric:       [x] Normal Affect [] No Hallucinations        [] Abnormal-     Other pertinent observable physical exam findings-     Due to this being a TeleHealth encounter, evaluation of the following organ systems is limited: Vitals/Constitutional/EENT/Resp/CV/GI//MS/Neuro/Skin/Heme-Lymph-Imm. ASSESSMENT/PLAN:  1. Acute URI  Discussed that we have to treat this as COVID-19 although at this time no way to test or know given his comorbidities. Discussed supportive meds to take and how to gauge when okay to return to work based on risk of secondary transmission (see AVS). Discussed prefer tylenol over ibuprofen. Discussed ER parameters - worsening shortness of breath, worsening LH, inability to tolerate PO.     25 minutes spent with patient- >50 % continuity of care and/or counseling. Return if symptoms worsen or fail to improve. An  electronic signature was used to authenticate this note.     --Lisa Langston MD on 3/30/2020 at 8:02

## 2021-03-22 ENCOUNTER — OFFICE VISIT (OUTPATIENT)
Dept: INTERNAL MEDICINE CLINIC | Age: 56
End: 2021-03-22
Payer: COMMERCIAL

## 2021-03-22 VITALS
BODY MASS INDEX: 28.75 KG/M2 | SYSTOLIC BLOOD PRESSURE: 104 MMHG | TEMPERATURE: 97 F | WEIGHT: 212 LBS | DIASTOLIC BLOOD PRESSURE: 56 MMHG | HEART RATE: 80 BPM

## 2021-03-22 DIAGNOSIS — R06.02 SHORTNESS OF BREATH: ICD-10-CM

## 2021-03-22 DIAGNOSIS — E66.3 OVER WEIGHT: ICD-10-CM

## 2021-03-22 DIAGNOSIS — R00.2 PALPITATIONS: Primary | ICD-10-CM

## 2021-03-22 DIAGNOSIS — R07.89 CHEST PRESSURE: ICD-10-CM

## 2021-03-22 PROCEDURE — 99214 OFFICE O/P EST MOD 30 MIN: CPT | Performed by: INTERNAL MEDICINE

## 2021-03-22 PROCEDURE — 93000 ELECTROCARDIOGRAM COMPLETE: CPT | Performed by: INTERNAL MEDICINE

## 2021-03-22 NOTE — PROGRESS NOTES
Yann Bermudez (:  1965) is a 54 y.o. male,Established patient, here for evaluation of the following chief complaint(s):  Dizziness (Dizzy, light headed, SOB. Symtpoms started a prrox 1 year ago, with exercise. The past week has been more consistant. Feels flutter when laying down)      Vitals:    21 1538   BP: (!) 104/56   Pulse: 80   Temp: 97 °F (36.1 °C)        ASSESSMENT/PLAN:  1. Palpitations  Patient noting palpitations intermittently. This is associated with light headedness, shortness of breath and some chest pressure. EKG showing PVC with normal sinus rhythm. PVCs could be cause of patient feeling palpitations. Will check labs including cmp, cbc and tsh. -     EKG 12 Lead  -     Comprehensive Metabolic Panel; Future  -     CBC Auto Differential; Future  -     TSH with Reflex; Future  -     Hemoglobin A1C; Future  -     Lipid Panel; Future  2. Shortness of breath  Patient with new shortness of breath not only on exertion. Associated with chest pressure and light headedness.   - obtain labs  - ekg showing pvcs, no arrhythmia noted   - will obtain cardiac stress test secondary to patients symptoms.   -     Comprehensive Metabolic Panel; Future  -     CBC Auto Differential; Future  -     TSH with Reflex; Future  -     CARDIAC STRESS TEST EXERCISE ONLY; Future  3. Over weight  Will screen for DM and HLD  -     Hemoglobin A1C; Future  -     Lipid Panel; Future  4. Chest pressure  Please see shortness of breath above  -     CARDIAC STRESS TEST EXERCISE ONLY; Future  5. Light headedness    worsened by position. Possible secondary to othostatic hypotension. BP is on the lower side today in the office.  - educated on calf pumps, compression socks, slow to stand and being liberal with salt. Will also evaluate labs and stress test.     No follow-ups on file.     SUBJECTIVE/OBJECTIVE:  HPI     Patient is a 55 yo fm with pmhx of anxiety and depression who presents secondary to lightheadedness and shortness of breath. Patient states over the last week or so he has been having light headedness with shortness of breath. Noticed this a year ago but only with working out. Now it is all the time. Waxes and wanes throughout the day. He does feels some chest pressure. Does feel palpitations intermittently. Has not lost consciousness. Feels the light headedness most when changing positions from laying to sitting and sitting to standing. Notes he also had some reflux symptoms about one week ago and then following is when he noticed all these symptoms getting worse. Of note patient did have ekg and stress testing in past. Stress test was in 2010 and was normal. I am unable to see ekg and stress test results, can only see mention of them in notes. Review of Systems ROS negative except for those noted in the HPI above. Vitals:    03/22/21 1538   BP: (!) 104/56   Pulse: 80   Temp: 97 °F (36.1 °C)       Physical Exam  Constitutional:       General: He is not in acute distress. Appearance: Normal appearance. He is not ill-appearing. HENT:      Head: Normocephalic and atraumatic. Nose: Nose normal. No congestion or rhinorrhea. Mouth/Throat:      Mouth: Mucous membranes are moist.      Pharynx: No oropharyngeal exudate or posterior oropharyngeal erythema. Eyes:      General: No scleral icterus. Extraocular Movements: Extraocular movements intact. Conjunctiva/sclera: Conjunctivae normal.   Neck:      Musculoskeletal: Normal range of motion. No muscular tenderness. Cardiovascular:      Rate and Rhythm: Normal rate and regular rhythm. Pulses: Normal pulses. Heart sounds: No murmur. No friction rub. No gallop. Pulmonary:      Effort: Pulmonary effort is normal. No respiratory distress. Breath sounds: No wheezing, rhonchi or rales. Abdominal:      General: Bowel sounds are normal. There is no distension. Palpations: Abdomen is soft. Tenderness:  There is no abdominal tenderness. There is no guarding or rebound. Musculoskeletal: Normal range of motion. General: No swelling or tenderness. Lymphadenopathy:      Cervical: No cervical adenopathy. Skin:     General: Skin is warm. Findings: No erythema or rash. Neurological:      General: No focal deficit present. Mental Status: He is alert and oriented to person, place, and time. Psychiatric:         Mood and Affect: Mood normal.         Behavior: Behavior normal.           An electronic signature was used to authenticate this note.     --Ann Avalos, DO

## 2021-04-16 ENCOUNTER — HOSPITAL ENCOUNTER (INPATIENT)
Age: 56
LOS: 4 days | Discharge: HOME | DRG: 274 | End: 2021-04-20
Attending: INTERNAL MEDICINE | Admitting: INTERNAL MEDICINE
Payer: COMMERCIAL

## 2021-04-16 ENCOUNTER — HOSPITAL ENCOUNTER (OUTPATIENT)
Dept: NON INVASIVE DIAGNOSTICS | Age: 56
Discharge: HOME | DRG: 274 | End: 2021-04-16
Payer: COMMERCIAL

## 2021-04-16 ENCOUNTER — TELEPHONE (OUTPATIENT)
Dept: INTERNAL MEDICINE CLINIC | Age: 56
End: 2021-04-16

## 2021-04-16 DIAGNOSIS — R06.02 SHORTNESS OF BREATH: ICD-10-CM

## 2021-04-16 DIAGNOSIS — R07.89 CHEST PRESSURE: ICD-10-CM

## 2021-04-16 PROBLEM — R94.39 ABNORMAL STRESS TEST: Status: ACTIVE | Noted: 2021-04-16

## 2021-04-16 LAB
A/G RATIO: 1.7 (ref 1.1–2.2)
ALBUMIN SERPL-MCNC: 4.3 G/DL (ref 3.4–5)
ALP BLD-CCNC: 54 U/L (ref 40–129)
ALT SERPL-CCNC: 16 U/L (ref 10–40)
ANION GAP SERPL CALCULATED.3IONS-SCNC: 10 MMOL/L (ref 3–16)
AST SERPL-CCNC: 17 U/L (ref 15–37)
BILIRUB SERPL-MCNC: 0.5 MG/DL (ref 0–1)
BUN BLDV-MCNC: 15 MG/DL (ref 7–20)
CALCIUM SERPL-MCNC: 8.9 MG/DL (ref 8.3–10.6)
CHLORIDE BLD-SCNC: 107 MMOL/L (ref 99–110)
CO2: 22 MMOL/L (ref 21–32)
CREAT SERPL-MCNC: 1 MG/DL (ref 0.9–1.3)
GFR AFRICAN AMERICAN: >60
GFR NON-AFRICAN AMERICAN: >60
GLOBULIN: 2.5 G/DL
GLUCOSE BLD-MCNC: 108 MG/DL (ref 70–99)
HCT VFR BLD CALC: 41.3 % (ref 40.5–52.5)
HEMOGLOBIN: 14.2 G/DL (ref 13.5–17.5)
INR BLD: 1.14 (ref 0.86–1.14)
MCH RBC QN AUTO: 31.2 PG (ref 26–34)
MCHC RBC AUTO-ENTMCNC: 34.4 G/DL (ref 31–36)
MCV RBC AUTO: 90.7 FL (ref 80–100)
PDW BLD-RTO: 13.8 % (ref 12.4–15.4)
PLATELET # BLD: 235 K/UL (ref 135–450)
PMV BLD AUTO: 8.6 FL (ref 5–10.5)
POTASSIUM SERPL-SCNC: 4.3 MMOL/L (ref 3.5–5.1)
PROTHROMBIN TIME: 13.3 SEC (ref 10–13.2)
RBC # BLD: 4.55 M/UL (ref 4.2–5.9)
SODIUM BLD-SCNC: 139 MMOL/L (ref 136–145)
TOTAL PROTEIN: 6.8 G/DL (ref 6.4–8.2)
WBC # BLD: 4.4 K/UL (ref 4–11)

## 2021-04-16 PROCEDURE — 6370000000 HC RX 637 (ALT 250 FOR IP)

## 2021-04-16 PROCEDURE — 99153 MOD SED SAME PHYS/QHP EA: CPT

## 2021-04-16 PROCEDURE — 36415 COLL VENOUS BLD VENIPUNCTURE: CPT

## 2021-04-16 PROCEDURE — B2151ZZ FLUOROSCOPY OF LEFT HEART USING LOW OSMOLAR CONTRAST: ICD-10-PCS | Performed by: INTERNAL MEDICINE

## 2021-04-16 PROCEDURE — 93458 L HRT ARTERY/VENTRICLE ANGIO: CPT

## 2021-04-16 PROCEDURE — B2111ZZ FLUOROSCOPY OF MULTIPLE CORONARY ARTERIES USING LOW OSMOLAR CONTRAST: ICD-10-PCS | Performed by: INTERNAL MEDICINE

## 2021-04-16 PROCEDURE — 99291 CRITICAL CARE FIRST HOUR: CPT | Performed by: INTERNAL MEDICINE

## 2021-04-16 PROCEDURE — 99255 IP/OBS CONSLTJ NEW/EST HI 80: CPT | Performed by: INTERNAL MEDICINE

## 2021-04-16 PROCEDURE — 6360000002 HC RX W HCPCS

## 2021-04-16 PROCEDURE — 85027 COMPLETE CBC AUTOMATED: CPT

## 2021-04-16 PROCEDURE — 93017 CV STRESS TEST TRACING ONLY: CPT

## 2021-04-16 PROCEDURE — 2500000003 HC RX 250 WO HCPCS

## 2021-04-16 PROCEDURE — 2060000000 HC ICU INTERMEDIATE R&B

## 2021-04-16 PROCEDURE — 2580000003 HC RX 258: Performed by: INTERNAL MEDICINE

## 2021-04-16 PROCEDURE — 99152 MOD SED SAME PHYS/QHP 5/>YRS: CPT

## 2021-04-16 PROCEDURE — C1769 GUIDE WIRE: HCPCS

## 2021-04-16 PROCEDURE — 6370000000 HC RX 637 (ALT 250 FOR IP): Performed by: INTERNAL MEDICINE

## 2021-04-16 PROCEDURE — 6360000002 HC RX W HCPCS: Performed by: INTERNAL MEDICINE

## 2021-04-16 PROCEDURE — 99223 1ST HOSP IP/OBS HIGH 75: CPT | Performed by: INTERNAL MEDICINE

## 2021-04-16 PROCEDURE — 2500000003 HC RX 250 WO HCPCS: Performed by: INTERNAL MEDICINE

## 2021-04-16 PROCEDURE — 4A023N7 MEASUREMENT OF CARDIAC SAMPLING AND PRESSURE, LEFT HEART, PERCUTANEOUS APPROACH: ICD-10-PCS | Performed by: INTERNAL MEDICINE

## 2021-04-16 PROCEDURE — 85610 PROTHROMBIN TIME: CPT

## 2021-04-16 PROCEDURE — 80061 LIPID PANEL: CPT

## 2021-04-16 PROCEDURE — 2709999900 HC NON-CHARGEABLE SUPPLY

## 2021-04-16 PROCEDURE — 80053 COMPREHEN METABOLIC PANEL: CPT

## 2021-04-16 PROCEDURE — C1894 INTRO/SHEATH, NON-LASER: HCPCS

## 2021-04-16 RX ORDER — ONDANSETRON 2 MG/ML
4 INJECTION INTRAMUSCULAR; INTRAVENOUS EVERY 6 HOURS PRN
Status: DISCONTINUED | OUTPATIENT
Start: 2021-04-16 | End: 2021-04-20 | Stop reason: HOSPADM

## 2021-04-16 RX ORDER — SODIUM CHLORIDE 9 MG/ML
INJECTION, SOLUTION INTRAVENOUS CONTINUOUS
Status: ACTIVE | OUTPATIENT
Start: 2021-04-16 | End: 2021-04-17

## 2021-04-16 RX ORDER — ACETAMINOPHEN 650 MG/1
650 SUPPOSITORY RECTAL EVERY 6 HOURS PRN
Status: DISCONTINUED | OUTPATIENT
Start: 2021-04-16 | End: 2021-04-20 | Stop reason: HOSPADM

## 2021-04-16 RX ORDER — ATORVASTATIN CALCIUM 40 MG/1
40 TABLET, FILM COATED ORAL NIGHTLY
Status: DISCONTINUED | OUTPATIENT
Start: 2021-04-16 | End: 2021-04-20 | Stop reason: HOSPADM

## 2021-04-16 RX ORDER — METOPROLOL TARTRATE 5 MG/5ML
5 INJECTION INTRAVENOUS ONCE
Status: COMPLETED | OUTPATIENT
Start: 2021-04-16 | End: 2021-04-16

## 2021-04-16 RX ORDER — METOPROLOL SUCCINATE 25 MG/1
25 TABLET, EXTENDED RELEASE ORAL DAILY
Status: DISCONTINUED | OUTPATIENT
Start: 2021-04-17 | End: 2021-04-20

## 2021-04-16 RX ORDER — ACETAMINOPHEN 325 MG/1
650 TABLET ORAL EVERY 6 HOURS PRN
Status: DISCONTINUED | OUTPATIENT
Start: 2021-04-16 | End: 2021-04-20 | Stop reason: HOSPADM

## 2021-04-16 RX ORDER — SODIUM CHLORIDE 0.9 % (FLUSH) 0.9 %
5-40 SYRINGE (ML) INJECTION EVERY 12 HOURS SCHEDULED
Status: DISCONTINUED | OUTPATIENT
Start: 2021-04-16 | End: 2021-04-20 | Stop reason: HOSPADM

## 2021-04-16 RX ORDER — SODIUM CHLORIDE 9 MG/ML
25 INJECTION, SOLUTION INTRAVENOUS PRN
Status: DISCONTINUED | OUTPATIENT
Start: 2021-04-16 | End: 2021-04-20 | Stop reason: HOSPADM

## 2021-04-16 RX ORDER — FLECAINIDE ACETATE 100 MG/1
100 TABLET ORAL EVERY 12 HOURS SCHEDULED
Status: DISCONTINUED | OUTPATIENT
Start: 2021-04-16 | End: 2021-04-20

## 2021-04-16 RX ORDER — SODIUM CHLORIDE 9 MG/ML
INJECTION, SOLUTION INTRAVENOUS CONTINUOUS
Status: DISCONTINUED | OUTPATIENT
Start: 2021-04-16 | End: 2021-04-17 | Stop reason: HOSPADM

## 2021-04-16 RX ORDER — METOPROLOL SUCCINATE 25 MG/1
12.5 TABLET, EXTENDED RELEASE ORAL DAILY
Status: DISCONTINUED | OUTPATIENT
Start: 2021-04-16 | End: 2021-04-16

## 2021-04-16 RX ORDER — PROMETHAZINE HYDROCHLORIDE 12.5 MG/1
12.5 TABLET ORAL EVERY 6 HOURS PRN
Status: DISCONTINUED | OUTPATIENT
Start: 2021-04-16 | End: 2021-04-20 | Stop reason: HOSPADM

## 2021-04-16 RX ORDER — ASPIRIN 81 MG/1
81 TABLET, CHEWABLE ORAL DAILY
Status: DISCONTINUED | OUTPATIENT
Start: 2021-04-16 | End: 2021-04-20 | Stop reason: HOSPADM

## 2021-04-16 RX ORDER — METOPROLOL SUCCINATE 25 MG/1
25 TABLET, EXTENDED RELEASE ORAL DAILY
Status: CANCELLED | OUTPATIENT
Start: 2021-04-16

## 2021-04-16 RX ORDER — POLYETHYLENE GLYCOL 3350 17 G/17G
17 POWDER, FOR SOLUTION ORAL DAILY PRN
Status: DISCONTINUED | OUTPATIENT
Start: 2021-04-16 | End: 2021-04-20 | Stop reason: HOSPADM

## 2021-04-16 RX ORDER — SODIUM CHLORIDE 0.9 % (FLUSH) 0.9 %
5-40 SYRINGE (ML) INJECTION PRN
Status: DISCONTINUED | OUTPATIENT
Start: 2021-04-16 | End: 2021-04-20 | Stop reason: HOSPADM

## 2021-04-16 RX ADMIN — FLECAINIDE ACETATE 100 MG: 100 TABLET ORAL at 21:17

## 2021-04-16 RX ADMIN — ATORVASTATIN CALCIUM 40 MG: 40 TABLET, FILM COATED ORAL at 21:17

## 2021-04-16 RX ADMIN — SODIUM CHLORIDE: 9 INJECTION, SOLUTION INTRAVENOUS at 09:16

## 2021-04-16 RX ADMIN — SODIUM CHLORIDE: 9 INJECTION, SOLUTION INTRAVENOUS at 14:43

## 2021-04-16 RX ADMIN — ENOXAPARIN SODIUM 40 MG: 40 INJECTION SUBCUTANEOUS at 18:31

## 2021-04-16 RX ADMIN — ASPIRIN 81 MG: 81 TABLET, CHEWABLE ORAL at 14:50

## 2021-04-16 RX ADMIN — METOPROLOL TARTRATE 5 MG: 5 INJECTION INTRAVENOUS at 09:16

## 2021-04-16 ASSESSMENT — PAIN SCALES - GENERAL: PAINLEVEL_OUTOF10: 0

## 2021-04-16 NOTE — H&P
Hospital Medicine History & Physical      PCP: Waylon Bojorquez MD    Date of Admission: 4/16/2021    Date of Service: Pt seen/examined on 4/16/2021 and Admitted to Inpatient with expected LOS greater than two midnights due to medical therapy. Chief Complaint:  Abnormal stress test      History Of Present Illness: Patient is 51-year-old gentleman came into hospital for stress test due to demented chest pain, palpitations and lightheadedness. During stress test patient had NSVT lasted for 1 to 2 minutes aborted by metoprolol push underwent to emergent left heart cath showed EF 55 to 60%, no obstructive CAD. Patient had a work-up with PCP including TSH, lipid panel, A1c reviewed. Past Medical History:          Diagnosis Date    Anxiety     Chest pain     Depression     PVC (premature ventricular contraction)        Past Surgical History:          Procedure Laterality Date    COLONOSCOPY N/A 2/1/2019    COLONOSCOPY performed by Rubio Garza MD at [address] [place] [address]       Medications Prior to Admission:      Prior to Admission medications    Medication Sig Start Date End Date Taking?  Authorizing Provider   Cholecalciferol (VITAMIN D3) 1000 units TABS Take 1 tablet by mouth daily 9/19/19  Yes Fredi Rodriguez MD   cetirizine (ZYRTEC) 10 MG tablet TAKE 1 TABLET BY MOUTH EVERY DAY 3/20/19  Yes Ben Jones,    niacin 250 MG extended release capsule Take 250 mg by mouth nightly   Yes Historical Provider, MD   Multiple Vitamins-Minerals (THERAPEUTIC MULTIVITAMIN-MINERALS) tablet Take 1 tablet by mouth daily   Yes Historical Provider, MD   Omega-3 Fatty Acids (FISH OIL) 1000 MG CAPS Take 3,000 mg by mouth daily   Yes Historical Provider, MD   ibuprofen (ADVIL;MOTRIN) 800 MG tablet TAKE 1 TABLET BY MOUTH EVERY 8 HOURS AS NEEDED FOR PAIN TAKE WITH FOOD. 4/24/17  Yes Grazyna Alonso MD   Fexofenadine HCl (ALLEGRA ALLERGY PO) Take by mouth every other day    Historical Provider, MD triamcinolone (NASACORT ALLERGY 24HR) 55 MCG/ACT nasal inhaler 2 sprays by Nasal route nightly  Patient not taking: Reported on 3/22/2021 1/25/19   Derrick Olivo DO   sodium chloride (OCEAN) 0.65 % nasal spray 3 sprays by Nasal route three times daily  Patient not taking: Reported on 3/22/2021 1/25/19   Derrick Olivo DO       Allergies:  Patient has no known allergies. Social History:      The patient currently lives home    TOBACCO:   reports that he has never smoked. He has never used smokeless tobacco.  ETOH:   reports no history of alcohol use. Family History:       Reviewed in detail and negative for DM, CAD, Cancer, CVA. Positive as follows:        Problem Relation Age of Onset    Breast Cancer Mother     Diabetes Father     Diabetes Maternal Grandmother     Prostate Cancer Neg Hx        REVIEW OF SYSTEMS:   Pertinent positives as noted in the HPI. All other systems reviewed and negative. PHYSICAL EXAM PERFORMED:    /69   Pulse 69   Temp 98.3 °F (36.8 °C) (Oral)   Resp 16   SpO2 97%     General appearance:  No apparent distress, appears stated age and cooperative. HEENT:  Normal cephalic, atraumatic without obvious deformity. Pupils equal, round, and reactive to light. Extra ocular muscles intact. Conjunctivae/corneas clear. Neck: Supple, with full range of motion. No jugular venous distention. Trachea midline. Respiratory:  Normal respiratory effort. Clear to auscultation, bilaterally without Rales/Wheezes/Rhonchi. Cardiovascular:  Regular rate and rhythm with normal S1/S2 without murmurs, rubs or gallops. Abdomen: Soft, non-tender, non-distended with normal bowel sounds. Musculoskeletal:  No clubbing, cyanosis or edema bilaterally. Full range of motion without deformity. Skin: Skin color, texture, turgor normal.  No rashes or lesions. Neurologic:  Neurovascularly intact without any focal sensory/motor deficits.  Cranial nerves: II-XII intact, grossly non-focal.  Psychiatric:  Alert and oriented, thought content appropriate, normal insight  Capillary Refill: Brisk,< 3 seconds   Peripheral Pulses: +2 palpable, equal bilaterally       Labs:     Recent Labs     04/16/21  1010   WBC 4.4   HGB 14.2   HCT 41.3        Recent Labs     04/16/21  1010      K 4.3      CO2 22   BUN 15   CREATININE 1.0   CALCIUM 8.9     Recent Labs     04/16/21  1010   AST 17   ALT 16   BILITOT 0.5   ALKPHOS 54     Recent Labs     04/16/21  1010   INR 1.14     No results for input(s): Marletta Harrington in the last 72 hours. Urinalysis:    No results found for: Jacek Hoffmann, BACTERIA, [facility], BLOODU, [facility], [place] [facility] [place], [place] [address]    Radiology:     CXR: I have reviewed the CXR with the following interpretation: N/A  EKG:  I have reviewed the EKG with the following interpretation: not available in Epic to review    No orders to display       ASSESSMENT:    C/Silas Echevarria 1106 Problems    Diagnosis Date Noted    Abnormal stress test [R94.39] 04/16/2021     #Abnormal stress test  S/p left heart cath no obstructive CAD  Cardio on board recommended echocardiogram  Continue aspirin, metoprolol, statin. #Hyperlipidemia  Continue atorvastatin. DVT Prophylaxis: lovenox  Diet: DIET CARDIAC;  Code Status: Full Code    PT/OT Eval Status: N/A    Dispo - inpatient. Pending echo and cardio eval.    This chart was likely completed using voice recognition technology and may contain unintended grammatical , phraseology,and/or punctuation errors         Waldo Delgado MD    Thank you Missael Schreiber MD for the opportunity to be involved in this patient's care. If you have any questions or concerns please feel free to contact me at [phone].

## 2021-04-16 NOTE — CONSULTS
Cardiology Consultation                                                                    Pt Name: Mahesh Mckenzie  Age: 54 y.o. Sex: male  : 1965  Location: [facility]7/3527-01    Referring Physician: Waldo Delgado MD  Primary cardiologist: tiara Ahuja      Reason for Consult:     Reason for Consultation/Chief Complaint: palpitations, dyspnea    HPI:      Mahesh Mckenzie is a 54 y.o. male with no significant past medical history. Patient started complaining of exertional palpitations, lightheadedness, shortness of breath and mild chest discomfort and his PCP schedule a GXT. GXT 2021 (I personally supervised and reviewed ECG images): At rest, normal sinus rhythm, no evidence of ischemia. During stage I of GXT patient started developing episodes of NSVT which became more frequent and into recovery. He received 1 dose of Lopressor 5 mg IV and normal saline 500 mL bolus. During these episodes, patient felt lightheaded and short of breath, symptoms very similar to the ones he was complaining of. Histories     Past Medical History:   has a past medical history of Anxiety, Chest pain, Depression, and PVC (premature ventricular contraction). Surgical History:   has a past surgical history that includes Colonoscopy (N/A, 2019). Social History:   reports that he has never smoked. He has never used smokeless tobacco. He reports that he does not drink alcohol or use drugs. Family History:  No evidence for sudden cardiac death or premature CAD      Medications:       Home Medications  Were reviewed and are listed in nursing record. and/or listed below  Prior to Admission medications    Medication Sig Start Date End Date Taking?  Authorizing Provider   Cholecalciferol (VITAMIN D3) 1000 units TABS Take 1 tablet by mouth daily 19  Yes José Miguel Leone MD   cetirizine (ZYRTEC) 10 MG tablet TAKE 1 TABLET BY MOUTH EVERY DAY 3/20/19  Yes Charis Eisenmenger, DO   niacin 250 MG extended RESPIRATORY: + dyspnea. No cough or wheezing, no sputum production. GASTROINTESTINAL: No N/V/D. No abdominal pain, appetite loss, blood in stools. GENITOURINARY: No dysuria, trouble voiding, or hematuria. MUSCULOSKELETAL:  No gait disturbance, weakness or joint complaints. NEUROLOGICAL: No headache, diplopia, change in muscle strength, numbness or tingling. No change in gait, balance, coordination, mood, affect, memory, mentation, behavior. ENDOCRINE: No excessive thirst, fluid intake, or urination. No tremor. HEMATOLOGIC: No abnormal bruising or bleeding. ALLERGY: No nasal congestion or hives. Physical Examination:     Vitals:    04/16/21 1315 04/16/21 1712   BP: 113/69 123/80   Pulse: 69 73   Resp: 16 18   Temp: 98.3 °F (36.8 °C) 97.9 °F (36.6 °C)   TempSrc: Oral Oral   SpO2: 97% 100%   Weight: 207 lb 6.4 oz (94.1 kg)    Height: 6' (1.829 m)        Wt Readings from Last 3 Encounters:   04/16/21 207 lb 6.4 oz (94.1 kg)   03/22/21 212 lb (96.2 kg)   11/11/19 212 lb 12.8 oz (96.5 kg)         General Appearance:  Alert, cooperative, no distress, appears stated age Appropriate weight   Head:  Normocephalic, without obvious abnormality, atraumatic   Eyes:  PERRL, conjunctiva/corneas clear EOM intact  Ears normal   Throat no lesions       Nose: Nares normal, no drainage or sinus tenderness   Throat: Lips, mucosa, and tongue normal   Neck: Supple, symmetrical, trachea midline, no adenopathy, thyroid: not enlarged, symmetric, no tenderness/mass/nodules, no carotid bruit. Lungs:   Respirations unlabored, clear to auscultation bilaterally, without any wheezes, rubs or ronchi.     Chest Wall:  No tenderness or deformity   Heart:  Regular rhythm, rate is controlled, S1, S2 normal, there is no murmur, there is no rub or gallop, no jvd, no bilateral lower extremity edema   Abdomen:   Soft, non-tender, bowel sounds active all four quadrants,  no masses, no organomegaly       Extremities: Extremities normal, thank you for providing me the opportunity to participate in the care of your patient. If you have any questions, please do not hesitate to contact me.      Emmanuel Cardozo MD, [facility] [place], [address]  [address] [address]  Ph: [phone]  Fax: [phone]

## 2021-04-16 NOTE — CONSULTS
hernia. · Musculoskeletal: No tenderness. No edema    · Lymphadenopathy: Has no cervical adenopathy. · Neurological: Alert and oriented. Cranial nerve II-XII grossly intact, No gross deficit to touch. · Skin: Skin is warm and dry. No rash, lesions, ulcerations noted. · Psychiatric: No anxiety nor agitation. Labs:  Reviewed. Recent Labs     04/16/21  1010      K 4.3      CO2 22   BUN 15   CREATININE 1.0     Recent Labs     04/16/21  1010   WBC 4.4   HGB 14.2   HCT 41.3   MCV 90.7        No results found for: CKTOTAL, CKMB, CKMBINDEX, TROPONINI  No results found for: BNP  Lab Results   Component Value Date    PROTIME 13.3 04/16/2021    INR 1.14 04/16/2021     Lab Results   Component Value Date    CHOL 203 03/22/2021    HDL 43 03/22/2021    HDL 33 06/02/2010    TRIG 72 03/22/2021       Diagnostic and imaging results reviewed. ECG: Sinus rhythm, outflow tract PVCs with transition of the level of V2/V3  Cath: No major obstructive coronary disease, LV ejection fraction 55%, based on LV gram  Transthoracic echocardiogram is pending    I have also personally reviewed his exercise stress test tracings which were consistent with outflow tract ventricular tachycardia    I independently reviewed the ECG and telemetry.      Scheduled Meds:   sodium chloride flush  5-40 mL Intravenous 2 times per day    enoxaparin  40 mg Subcutaneous Q24H    aspirin  81 mg Oral Daily    atorvastatin  40 mg Oral Nightly    [START ON 4/17/2021] metoprolol succinate  25 mg Oral Daily    flecainide  100 mg Oral 2 times per day     Continuous Infusions:   sodium chloride      sodium chloride 75 mL/hr at 04/16/21 1443     PRN Meds:.sodium chloride flush, sodium chloride, promethazine **OR** ondansetron, polyethylene glycol, acetaminophen **OR** acetaminophen, perflutren lipid microspheres     Assessment:   Patient Active Problem List    Diagnosis Date Noted    Abnormal stress test 04/16/2021    Moderate episode of recurrent major depressive disorder ([facility] [address] 01/17/2017    Anxiety disorder 12/07/2012    Depression 05/12/2010      Active Hospital Problems    Diagnosis Date Noted    Abnormal stress test [R94.39] 04/16/2021     Recommendation (s):  1. Nonsustained ventricular tachycardia  2. Frequent PVCs  3. Anxiety    Morphology is consistent with outflow tract ventricular tachycardia  Transition at V2/V3  Lead 1 negative    More than likely, exiting from RVOT/LVOT  Started on flecainide 100 mg p.o. twice daily  Continue Toprol  Monitor in telemetry for further runs of nonsustained VT/PVCs  EKG tomorrow to evaluate QRS duration and TX interval  Discussed about the treatment options including antiarrhythmic therapy, EP study and possible ablation. Thank you for allowing me to participate in the care of [place] . If you have any questions/comments, please do not hesitate to contact us. Jamila Zavala MD   Cardiac Electrophysiology  [address] [facility]    For any EP related issues after 5 PM, contact [address] on call cardiology through .

## 2021-04-16 NOTE — PROGRESS NOTES
Pt arrived on unit. Admission paperwork completed. VSS. Oriented pt and family to room. Call light within reach. Denies further needs at this time. Awaiting MD orders.

## 2021-04-16 NOTE — PROGRESS NOTES
aat 2:52 into the treadmill portion of gxt the pt developed intermittent runs of v tach lasting aprox 1 min 50sec until metoprolol 5 mg was given ivp  Dr Leslie Cleary was summoned to bedside and metoprolol has resolved the v tach  Episodes a saline bolus of 500 is running

## 2021-04-16 NOTE — PLAN OF CARE
After being on flecainide, if the EKG from tomorrow shows normal PA interval and normal QRS duration, he can be discharged tomorrow  We will arrange a 2-week CAM monitor as an outpatient  Cardiac MRI as an outpatient  He will follow up with me as an outpatient in 6 weeks    [address] [phone]

## 2021-04-16 NOTE — PLAN OF CARE
Brief Pre-Op Note/Sedation Assessment      Ligia Snyder  1965  Room/bed info not found      [id number]  10:00 AM    Planned Procedure: Cardiac Catheterization Procedure    Post Procedure Plan: Return to same level of care    Consent: I have discussed with the patient and/or the patient representative the indication, alternatives, and the possible risks and/or complications of the planned procedure and the anesthesia methods. The patient and/or patient representative appear to understand and agree to proceed. Chief Complaint: Chest Pain/Pressure      Indications for Cath Procedure:  Suspected CAD and Cardiac Arrythmia  Anginal Classification within 2 weeks:  CCS III - Symptoms with everyday living activities, i.e., moderate limitation  NYHA Heart Failure Class within 2 weeks: No symptoms  Is Cath Lab Visit Valve-related?: No  Surgical Risk: N/A  Functional Type: N/A    Anti- Anginal Meds within 2 weeks:   Yes: Aspirin    Stress or Imaging Studies Performed (within 6 months):  Standard Exercise Stress Result:  Ventricular tach Risk/Extent of Ischemia:  Unavailable     Vital Signs: There were no vitals taken for this visit.     Allergies:  No Known Allergies    Past Medical History:  Past Medical History:   Diagnosis Date    Anxiety     Chest pain     Depression     PVC (premature ventricular contraction)          Surgical History:  Past Surgical History:   Procedure Laterality Date    COLONOSCOPY N/A 2/1/2019    COLONOSCOPY performed by Moon Perez MD at [facility] END[facility]COPY         Medications:  Current Outpatient Medications   Medication Sig Dispense Refill    Fexofenadine HCl (ALLEGRA ALLERGY PO) Take by mouth every other day      Cholecalciferol (VITAMIN D3) 1000 units TABS Take 1 tablet by mouth daily 90 tablet 0    cetirizine (ZYRTEC) 10 MG tablet TAKE 1 TABLET BY MOUTH EVERY DAY (Patient not taking: Reported on 3/22/2021) 90 tablet 0    niacin 250 MG extended release capsule Take 250 mg by mouth nightly      Multiple Vitamins-Minerals (THERAPEUTIC MULTIVITAMIN-MINERALS) tablet Take 1 tablet by mouth daily      Omega-3 Fatty Acids (FISH OIL) 1000 MG CAPS Take 3,000 mg by mouth daily      triamcinolone (NASACORT ALLERGY 24HR) 55 MCG/ACT nasal inhaler 2 sprays by Nasal route nightly (Patient not taking: Reported on 3/22/2021) 1 Inhaler 0    sodium chloride (OCEAN) 0.65 % nasal spray 3 sprays by Nasal route three times daily (Patient not taking: Reported on 3/22/2021) 1 Bottle 3    ibuprofen (ADVIL;MOTRIN) 800 MG tablet TAKE 1 TABLET BY MOUTH EVERY 8 HOURS AS NEEDED FOR PAIN TAKE WITH FOOD. (Patient not taking: Reported on 3/22/2021) 60 tablet 1     No current facility-administered medications for this encounter. Facility-Administered Medications Ordered in Other Encounters   Medication Dose Route Frequency Provider Last Rate Last Admin    0.9 % sodium chloride infusion   Intravenous Continuous Gray Justin  mL/hr at 04/16/21 0916 New Bag at 04/16/21 0916           Pre-Sedation:    Pre-Sedation Documentation and Exam:  I have personally completed a history, physical exam & review of systems for this patient (see notes). Prior History of Anesthesia Complications:   none    Modified Mallampati:  III (soft palate, base of uvula visible)    ASA Classification:  Class 3 - A patient with severe systemic disease that limits activity but is not incapacitating      Waldemar Scale: Activity:  2 - Able to move 4 extremities voluntarily on command  Respiration:  2 - Able to breathe deeply and cough freely  Circulation:  2 - BP+/- 20mmHg of normal  Consciousness:  2 - Fully awake  Oxygen Saturation (color):  2 - Able to maintain oxygen saturation >92% on room air    Sedation/Anesthesia Plan:  Guard the patient's safety and welfare. Minimize physical discomfort and pain.   Minimize negative psychological responses to treatment by providing sedation and analgesia and maximize the potential amnesia. Patient to meet pre-procedure discharge plan.     Medication Planned:  midazolam intravenously and fentanyl intravenously    Patient is an appropriate candidate for plan of sedation: yes      Electronically signed by Milagros Camacho MD on 4/16/2021 at 10:00 AM

## 2021-04-16 NOTE — TELEPHONE ENCOUNTER
Spoke with Dr. Henrique Dunlap at [facility]. Had several runs of VT during stress test, so they will be taking him directly to the cath lab today.

## 2021-04-16 NOTE — CONSULTS
to vital sign monitoring, telemetry monitoring, continuous pulse oximety, IV medication, clinical response to the IV medications, documentation time, consultation time, interpretation of lab data, review of nursing notes and old record review.

## 2021-04-16 NOTE — PROGRESS NOTES
4 Eyes Admission Assessment     I agree as the admission nurse that 2 RN's have performed a thorough Head to Toe Skin Assessment on the patient. ALL assessment sites listed below have been assessed on admission. Areas assessed by both nurses: Sujata Hayes  [x]   Head, Face, and Ears   [x]   Shoulders, Back, and Chest  [x]   Arms, Elbows, and Hands   [x]   Coccyx, Sacrum, and Ischium  [x]   Legs, Feet, and Heels        Does the Patient have Skin Breakdown?   No         Chadd Prevention initiated:  No   Wound Care Orders initiated:  No      [facility] nurse consulted for Pressure Injury (Stage 3,4, Unstageable, DTI, NWPT, and Complex wounds) or Chadd score 18 or lower:  NA      Nurse 1 eSignature: Electronically signed by Avery Bloch, RN on 4/16/21 at 7:56 PM EDT    **SHARE this note so that the co-signing nurse is able to place an eSignature**    Nurse 2 eSignature: Electronically signed by Lynette Collado RN on 4/16/21 at 7:57 PM EDT

## 2021-04-17 LAB
ANION GAP SERPL CALCULATED.3IONS-SCNC: 10 MMOL/L (ref 3–16)
BUN BLDV-MCNC: 14 MG/DL (ref 7–20)
CALCIUM SERPL-MCNC: 8.8 MG/DL (ref 8.3–10.6)
CHLORIDE BLD-SCNC: 108 MMOL/L (ref 99–110)
CO2: 22 MMOL/L (ref 21–32)
CREAT SERPL-MCNC: 0.8 MG/DL (ref 0.9–1.3)
EKG ATRIAL RATE: 119 BPM
EKG DIAGNOSIS: NORMAL
EKG P AXIS: 85 DEGREES
EKG Q-T INTERVAL: 330 MS
EKG QRS DURATION: 88 MS
EKG QTC CALCULATION (BAZETT): 464 MS
EKG R AXIS: 66 DEGREES
EKG T AXIS: 76 DEGREES
EKG VENTRICULAR RATE: 119 BPM
GFR AFRICAN AMERICAN: >60
GFR NON-AFRICAN AMERICAN: >60
GLUCOSE BLD-MCNC: 87 MG/DL (ref 70–99)
HCT VFR BLD CALC: 41.1 % (ref 40.5–52.5)
HEMOGLOBIN: 14.1 G/DL (ref 13.5–17.5)
LV EF: 55 %
LVEF MODALITY: NORMAL
MCH RBC QN AUTO: 31.4 PG (ref 26–34)
MCHC RBC AUTO-ENTMCNC: 34.2 G/DL (ref 31–36)
MCV RBC AUTO: 91.8 FL (ref 80–100)
PDW BLD-RTO: 13.7 % (ref 12.4–15.4)
PLATELET # BLD: 217 K/UL (ref 135–450)
PMV BLD AUTO: 8.8 FL (ref 5–10.5)
POTASSIUM REFLEX MAGNESIUM: 3.9 MMOL/L (ref 3.5–5.1)
RBC # BLD: 4.48 M/UL (ref 4.2–5.9)
SODIUM BLD-SCNC: 140 MMOL/L (ref 136–145)
WBC # BLD: 5.1 K/UL (ref 4–11)

## 2021-04-17 PROCEDURE — 2060000000 HC ICU INTERMEDIATE R&B

## 2021-04-17 PROCEDURE — 80048 BASIC METABOLIC PNL TOTAL CA: CPT

## 2021-04-17 PROCEDURE — 93306 TTE W/DOPPLER COMPLETE: CPT

## 2021-04-17 PROCEDURE — 99233 SBSQ HOSP IP/OBS HIGH 50: CPT | Performed by: NURSE PRACTITIONER

## 2021-04-17 PROCEDURE — 93010 ELECTROCARDIOGRAM REPORT: CPT | Performed by: INTERNAL MEDICINE

## 2021-04-17 PROCEDURE — 85027 COMPLETE CBC AUTOMATED: CPT

## 2021-04-17 PROCEDURE — 6370000000 HC RX 637 (ALT 250 FOR IP): Performed by: NURSE PRACTITIONER

## 2021-04-17 PROCEDURE — 2580000003 HC RX 258: Performed by: INTERNAL MEDICINE

## 2021-04-17 PROCEDURE — 93005 ELECTROCARDIOGRAM TRACING: CPT | Performed by: INTERNAL MEDICINE

## 2021-04-17 PROCEDURE — 6360000002 HC RX W HCPCS: Performed by: INTERNAL MEDICINE

## 2021-04-17 PROCEDURE — 6370000000 HC RX 637 (ALT 250 FOR IP): Performed by: INTERNAL MEDICINE

## 2021-04-17 RX ORDER — VERAPAMIL HYDROCHLORIDE 80 MG/1
80 TABLET ORAL EVERY 8 HOURS SCHEDULED
Status: DISCONTINUED | OUTPATIENT
Start: 2021-04-17 | End: 2021-04-20 | Stop reason: HOSPADM

## 2021-04-17 RX ADMIN — METOPROLOL SUCCINATE 25 MG: 25 TABLET, EXTENDED RELEASE ORAL at 08:15

## 2021-04-17 RX ADMIN — VERAPAMIL HYDROCHLORIDE 80 MG: 80 TABLET ORAL at 11:37

## 2021-04-17 RX ADMIN — VERAPAMIL HYDROCHLORIDE 80 MG: 80 TABLET ORAL at 23:19

## 2021-04-17 RX ADMIN — ENOXAPARIN SODIUM 40 MG: 40 INJECTION SUBCUTANEOUS at 18:34

## 2021-04-17 RX ADMIN — Medication 10 ML: at 20:53

## 2021-04-17 RX ADMIN — Medication 10 ML: at 10:45

## 2021-04-17 RX ADMIN — ATORVASTATIN CALCIUM 40 MG: 40 TABLET, FILM COATED ORAL at 20:52

## 2021-04-17 RX ADMIN — FLECAINIDE ACETATE 100 MG: 100 TABLET ORAL at 08:15

## 2021-04-17 RX ADMIN — ASPIRIN 81 MG: 81 TABLET, CHEWABLE ORAL at 08:15

## 2021-04-17 RX ADMIN — FLECAINIDE ACETATE 100 MG: 100 TABLET ORAL at 20:52

## 2021-04-17 ASSESSMENT — PAIN SCALES - GENERAL
PAINLEVEL_OUTOF10: 0
PAINLEVEL_OUTOF10: 0

## 2021-04-17 NOTE — PLAN OF CARE
Problem: Falls - Risk of:  Goal: Will remain free from falls  Description: Will remain free from falls  Outcome: Met This Shift  Note: Patient remained free of falls during shift. Patient remains a Andersen Fall Risk: Medium (25-44). Patient makes no attempt to get out of bed without help from hospital staff. Will continue to encourage call light usage prior to ambulating. Call light within reach and hourly rounding in place. Problem: Cardiac:  Goal: Ability to maintain vital signs within normal range will improve  Description: Ability to maintain vital signs within normal range will improve  Outcome: Ongoing  Note: VSS. On tele in NSR, no arrhythmia noted.

## 2021-04-17 NOTE — PROGRESS NOTES
CC: NSVT  HPI: 54 y.o. who can to [facility] for stress test d/t LH/dizziness, palpitations/fluttering and SOB x 1 year. Denies cp or syncope. During stress test he had significant NSVT. He had LHC which demonstrated no CAD and EF 55%. EP consulted for ICD    S: Cont to have LH/dizziness and palpitations. ECG and TELE shows frequent short runs of NSVT    Tele: NSVT    O:  Physical Exam:  BP (!) 155/88   Pulse 94   Temp 97.8 °F (36.6 °C) (Oral)   Resp 18   Ht 6' (1.829 m)   Wt 207 lb 6.4 oz (94.1 kg)   SpO2 100%   BMI 28.13 kg/m²    General (appearance):  No acute distress  Eyes: anicteric   Neck: soft, No JVD  Ears/Nose/Mouth/Thorat: No cyanosis  CV: Reg, frequent ectopy    Respiratory:  Clear, normal effort  GI: soft, non-tender, non-distended  Skin: Warm, dry. No rashes  Neuro/Psych: Alert and oriented x 3. Appropriate behavior  Ext:  No c/c. No edema  Pulses:  2+ right radial. Right wrist soft, no hematoma. Good cap refill. I.O's=     Weight  Admission: Weight: 207 lb 6.4 oz (94.1 kg)   Today: Weight: 207 lb 6.4 oz (94.1 kg)    CBC:   Recent Labs     21  1010 21  0508   WBC 4.4 5.1   HGB 14.2 14.1   HCT 41.3 41.1   MCV 90.7 91.8    217     BMP:   Recent Labs     21  1010 21  0508    140   K 4.3 3.9    108   CO2 22 22   BUN 15 14   CREATININE 1.0 0.8*     Mag: No results found for: MG  LIVER PROFILE:   Recent Labs     21  1010   AST 17   ALT 16   BILITOT 0.5   ALKPHOS 54     PT/INR:   Recent Labs     21  1010   PROTIME 13.3*   INR 1.14     Pro-BNP: No results found for: PROBNP  CARDIAC ENZYMES: No results for input(s): CKTOTAL, CKMB, TROPONINI in the last 72 hours. Imagin2021 Coronary angiogram  Angiographic Findings:  Right dominant system  Left Main:  Normal  Left Anterior Descending:  Minimal irregularities, Mid ~20% stenosis. Circumflex:  Minimal irregularities  Right Coronary:  Proximal, < 20% stenosis.  Minimal plaque  Left Ventriculogram:  LVEF 55-60%.   Hemodynamics (mm Hg):  Left Ventricular Pressure:  102/0, 15  Central Aortic Pressure:  102/61 (81)  Conclusions:  -No obstructive CAD  -Normal LVEF    Assessment:  54 y.o. with NSVT and PVC  Issues:  -NSVT  -PVC    Plan:  -Keep K>4, Mg>2.  -Flecainide 100 mg po bid   -Toprol  -Echo pending  -Verapamil 80 mg TID     Will monitor overnight and possibly d/c tomorrow; if continues to have NSVT them EP study/ablation on Monday   If d/c Sunday Follow up in office next week for 2 week CAM monitor  Cardiac MRI as outpatient  Follow up with Dr Marlise Lesch in 6 weeks     Discussed with Dr Marlise Lesch and Dr Suzie Celaya

## 2021-04-17 NOTE — PROGRESS NOTES
Hospitalist Progress Note      PCP: Waylon Bojorquez MD    Date of Admission: 4/16/2021    Chief Complaint: Abnormal stress test    Hospital Course: Patient is 80-year-old gentleman came into hospital for stress test due to demented chest pain, palpitations and lightheadedness. During stress test patient had NSVT lasted for 1 to 2 minutes aborted by metoprolol push underwent to emergent left heart cath showed EF 55 to 60%, no obstructive CAD.       Subjective: Overnight patient had episodes of VT asymptomatic. Denies any palpitation, chest pain, nausea, vomiting. Medications:  Reviewed    Infusion Medications    sodium chloride       Scheduled Medications    verapamil  80 mg Oral 3 times per day    sodium chloride flush  5-40 mL Intravenous 2 times per day    enoxaparin  40 mg Subcutaneous Q24H    aspirin  81 mg Oral Daily    atorvastatin  40 mg Oral Nightly    metoprolol succinate  25 mg Oral Daily    flecainide  100 mg Oral 2 times per day     PRN Meds: sodium chloride flush, sodium chloride, promethazine **OR** ondansetron, polyethylene glycol, acetaminophen **OR** acetaminophen, perflutren lipid microspheres      Intake/Output Summary (Last 24 hours) at 4/17/2021 1037  Last data filed at 4/17/2021 0469  Gross per 24 hour   Intake 2565 ml   Output 2450 ml   Net 115 ml       Physical Exam Performed:    BP (!) 155/88   Pulse 94   Temp 97.8 °F (36.6 °C) (Oral)   Resp 18   Ht 6' (1.829 m)   Wt 207 lb 6.4 oz (94.1 kg)   SpO2 100%   BMI 28.13 kg/m²     General appearance: No apparent distress, appears stated age and cooperative. HEENT: Pupils equal, round, and reactive to light. Conjunctivae/corneas clear. Neck: Supple, with full range of motion. No jugular venous distention. Trachea midline. Respiratory:  Normal respiratory effort. Clear to auscultation, bilaterally without Rales/Wheezes/Rhonchi.   Cardiovascular: Regular rate and rhythm with normal S1/S2 without murmurs, rubs or gallops. Abdomen: Soft, non-tender, non-distended with normal bowel sounds. Musculoskeletal: No clubbing, cyanosis or edema bilaterally. Full range of motion without deformity. Skin: Skin color, texture, turgor normal.  No rashes or lesions. Neurologic:  Neurovascularly intact without any focal sensory/motor deficits. Cranial nerves: II-XII intact, grossly non-focal.  Psychiatric: Alert and oriented, thought content appropriate, normal insight  Capillary Refill: Brisk,< 3 seconds   Peripheral Pulses: +2 palpable, equal bilaterally       Labs:   Recent Labs     04/16/21  1010 04/17/21  0508   WBC 4.4 5.1   HGB 14.2 14.1   HCT 41.3 41.1    217     Recent Labs     04/16/21  1010 04/17/21  0508    140   K 4.3 3.9    108   CO2 22 22   BUN 15 14   CREATININE 1.0 0.8*   CALCIUM 8.9 8.8     Recent Labs     04/16/21  1010   AST 17   ALT 16   BILITOT 0.5   ALKPHOS 54     Recent Labs     04/16/21  1010   INR 1.14     No results for input(s): Paralee Fontan in the last 72 hours. Urinalysis:    No results found for: Leo Radish, BACTERIA, RBCUA, BLOODU, [address] [place] [address]    Radiology:  No orders to display           Assessment/Plan:    Active Hospital Problems    Diagnosis Date Noted    Abnormal stress test [R94.39] 04/16/2021     #Nonsustained VT  S/p left heart cath 4/16 nonobstructive CAD  Continue aspirin, flecainide 100 mg twice daily. Discussed with cardiology will start verapamil due to VT episodes overnight. If no episode overnight patient can be discharged home tomorrow otherwise he will be needing an EP study. If patient goes home tomorrow he will need heart monitor, cardiac MRI as an outpatient. Follow-up on echo report. #Hyperlipidemia  Continue statin. DVT Prophylaxis: lovenox  Diet: DIET CARDIAC;  Code Status: Full Code    PT/OT Eval Status: N/A    Dispo - inpatient. Monitor overnight.  Likely d/c in am if no NSVT on tele    This chart was likely completed using voice recognition technology and may contain unintended grammatical , phraseology,and/or punctuation errors      Yair Garcia MD

## 2021-04-17 NOTE — PLAN OF CARE
Problem: Falls - Risk of:  Goal: Will remain free from falls  Description: Will remain free from falls  4/17/2021 1906 by Chris Felix RN  Outcome: Ongoing  Note: Pt is a Low fall risk. Explained fall risk precautions to pt and rationale behind their use to keep the patient safe. Belongings are in reach. Pt encouraged to notify staff for any and all assistance. Staff present in tx suite throughout entirety of pts treatment to monitor and protect from falls. Assistance provided when ambulating to restroom utilizing Stay With Me. Problem: Cardiac:  Goal: Ability to maintain vital signs within normal range will improve  Description: Ability to maintain vital signs within normal range will improve  4/17/2021 1906 by Chris Felix RN  Note: Patient with short runs of v-tach this morning. Patient asymptomatic during these episodes. Cardiology aware and new medication added. Throughout rest of day, patient had frequent PVC's but no v-tach.

## 2021-04-18 LAB
CHOLESTEROL, TOTAL: 193 MG/DL (ref 0–199)
HDLC SERPL-MCNC: 39 MG/DL (ref 40–60)
LDL CHOLESTEROL CALCULATED: 145 MG/DL
TRIGL SERPL-MCNC: 47 MG/DL (ref 0–150)
VLDLC SERPL CALC-MCNC: 9 MG/DL

## 2021-04-18 PROCEDURE — 6360000002 HC RX W HCPCS: Performed by: INTERNAL MEDICINE

## 2021-04-18 PROCEDURE — 6370000000 HC RX 637 (ALT 250 FOR IP): Performed by: INTERNAL MEDICINE

## 2021-04-18 PROCEDURE — 2580000003 HC RX 258: Performed by: INTERNAL MEDICINE

## 2021-04-18 PROCEDURE — 2060000000 HC ICU INTERMEDIATE R&B

## 2021-04-18 PROCEDURE — 99232 SBSQ HOSP IP/OBS MODERATE 35: CPT | Performed by: NURSE PRACTITIONER

## 2021-04-18 PROCEDURE — 6370000000 HC RX 637 (ALT 250 FOR IP): Performed by: NURSE PRACTITIONER

## 2021-04-18 PROCEDURE — 93005 ELECTROCARDIOGRAM TRACING: CPT | Performed by: INTERNAL MEDICINE

## 2021-04-18 RX ADMIN — ATORVASTATIN CALCIUM 40 MG: 40 TABLET, FILM COATED ORAL at 20:01

## 2021-04-18 RX ADMIN — ASPIRIN 81 MG: 81 TABLET, CHEWABLE ORAL at 09:39

## 2021-04-18 RX ADMIN — VERAPAMIL HYDROCHLORIDE 80 MG: 80 TABLET ORAL at 07:46

## 2021-04-18 RX ADMIN — Medication 10 ML: at 20:01

## 2021-04-18 RX ADMIN — ENOXAPARIN SODIUM 40 MG: 40 INJECTION SUBCUTANEOUS at 18:22

## 2021-04-18 RX ADMIN — Medication 10 ML: at 09:39

## 2021-04-18 RX ADMIN — METOPROLOL SUCCINATE 25 MG: 25 TABLET, EXTENDED RELEASE ORAL at 09:39

## 2021-04-18 ASSESSMENT — PAIN SCALES - GENERAL
PAINLEVEL_OUTOF10: 0

## 2021-04-18 NOTE — PLAN OF CARE
Problem: Falls - Risk of:  Goal: Will remain free from falls  Description: Will remain free from falls  4/18/2021 0642 by Marisela Muniz RN  Outcome: Ongoing  Note: Patient remained free of falls during shift. Patient is a Andersen Fall Risk: Medium (25-44); uses call light appropriately, ambulates with a steady gait and no assistive devices. Orthostatic blood pressures assessed and patient remains negative. Will continue to encourage ambulation and implement POC. Call light within reach and hourly rounding in place. Problem: Falls - Risk of:  Goal: Absence of physical injury  Description: Absence of physical injury  Outcome: Ongoing     Problem: Cardiac:  Goal: Ability to maintain vital signs within normal range will improve  Description: Ability to maintain vital signs within normal range will improve  4/18/2021 0642 by Marisela Muniz RN  Outcome: Ongoing  Note: VSS and cardiac monitoring stable through the hs. Pt maintained NSR with PVC's.      Problem: Cardiac:  Goal: Cardiovascular alteration will improve  Description: Cardiovascular alteration will improve  Outcome: Ongoing

## 2021-04-18 NOTE — PROGRESS NOTES
CC: NSVT  HPI: 54 y.o. who can to [facility] for stress test d/t LH/dizziness, palpitations/fluttering and SOB x 1 year. Denies cp or syncope. During stress test he had significant NSVT. He had LHC which demonstrated no CAD and EF 55%. EP consulted for ICD    S: Feeling better today. LH/dizziness and palpitations gone     Tele: sinus PVC    O:  Physical Exam:  /74   Pulse 75   Temp 97.6 °F (36.4 °C) (Oral)   Resp 18   Ht 6' (1.829 m)   Wt 207 lb 6.4 oz (94.1 kg)   SpO2 100%   BMI 28.13 kg/m²    General (appearance):  No acute distress  Eyes: anicteric   Neck: soft, No JVD  Ears/Nose/Mouth/Thorat: No cyanosis  CV: RRR   Respiratory:  Clear, normal effort  GI: soft, non-tender, non-distended  Skin: Warm, dry. No rashes  Neuro/Psych: Alert and oriented x 3. Appropriate behavior  Ext:  No c/c. No edema  Pulses:  2+ right radial. Right wrist soft, no hematoma. Good cap refill. I.O's=     Weight  Admission: Weight: 207 lb 6.4 oz (94.1 kg)   Today: Weight: 207 lb 6.4 oz (94.1 kg)    CBC:   Recent Labs     21  1010 21  0508   WBC 4.4 5.1   HGB 14.2 14.1   HCT 41.3 41.1   MCV 90.7 91.8    217     BMP:   Recent Labs     21  1010 21  0508    140   K 4.3 3.9    108   CO2 22 22   BUN 15 14   CREATININE 1.0 0.8*     Mag: No results found for: MG  LIVER PROFILE:   Recent Labs     21  1010   AST 17   ALT 16   BILITOT 0.5   ALKPHOS 54     PT/INR:   Recent Labs     21  1010   PROTIME 13.3*   INR 1.14     Pro-BNP: No results found for: PROBNP  CARDIAC ENZYMES: No results for input(s): CKTOTAL, CKMB, TROPONINI in the last 72 hours. Imagin2021 Echo:  Left ventricular cavity size is normal. Normal left ventricular wall   thickness. Left ventricular function is normal with ejection fraction   estimated at 55%. Septal bounce noted. Normal diastolic function.  Estimated   pulmonary artery systolic pressure is at 27 mmHg assuming a right atrial   pressure of 3 mmHg.     4/16/2021 Coronary angiogram  Angiographic Findings:  Right dominant system  Left Main:  Normal  Left Anterior Descending:  Minimal irregularities, Mid ~20% stenosis. Circumflex:  Minimal irregularities  Right Coronary:  Proximal, < 20% stenosis. Minimal plaque  Left Ventriculogram:  LVEF 55-60%.   Hemodynamics (mm Hg):  Left Ventricular Pressure:  102/0, 15  Central Aortic Pressure:  102/61 (81)  Conclusions:  -No obstructive CAD  -Normal LVEF    Assessment:  54 y.o. with NSVT and PVC  Issues:  -NSVT  -PVC    Plan:  -Keep K>4, Mg>2.  -Flecainide 100 mg po bid   -Toprol  -Verapamil 80 mg TID     No further NSVT  Cont flecainide and verapamil and toprol  D/C today   Follow up in office this week for 2 week CAM monitor  Outpatient Cardiac MRI  Follow up with Dr Jesus Lee in 6 weeks

## 2021-04-18 NOTE — PROGRESS NOTES
with full range of motion. No jugular venous distention. Trachea midline. Respiratory:  Normal respiratory effort. Clear to auscultation, bilaterally without Rales/Wheezes/Rhonchi. Cardiovascular: Regular rate and rhythm with normal S1/S2 without murmurs, rubs or gallops. Abdomen: Soft, non-tender, non-distended with normal bowel sounds. Musculoskeletal: No clubbing, cyanosis or edema bilaterally. Full range of motion without deformity. Skin: Skin color, texture, turgor normal.  No rashes or lesions. Neurologic:  Neurovascularly intact without any focal sensory/motor deficits. Cranial nerves: II-XII intact, grossly non-focal.  Psychiatric: Alert and oriented, thought content appropriate, normal insight  Capillary Refill: Brisk,< 3 seconds   Peripheral Pulses: +2 palpable, equal bilaterally       Labs:   Recent Labs     04/16/21  1010 04/17/21  0508   WBC 4.4 5.1   HGB 14.2 14.1   HCT 41.3 41.1    217     Recent Labs     04/16/21  1010 04/17/21  0508    140   K 4.3 3.9    108   CO2 22 22   BUN 15 14   CREATININE 1.0 0.8*   CALCIUM 8.9 8.8     Recent Labs     04/16/21  1010   AST 17   ALT 16   BILITOT 0.5   ALKPHOS 54     Recent Labs     04/16/21  1010   INR 1.14     No results for input(s): Paralee Fontan in the last 72 hours. Urinalysis:    No results found for: Leo Radish, BACTERIA, RBCUA, BLOODU, [address], [place] [address]    Radiology:  No orders to display           Assessment/Plan:    Active Hospital Problems    Diagnosis Date Noted    Abnormal stress test [R94.39] 04/16/2021     #Nonsustained VT  S/p left heart cath 4/16 nonobstructive CAD  Continue aspirin,  PVCs on telemetry. Discussed with Dr. Kiesha Beavers hold verapamil, flecainide. Plan for EP study tomorrow late afternoon. Patient can have light breakfast before 7 AM n.p.o. afterwards. Echo EF 55%, no regional wall motion noted. #Hyperlipidemia  Continue statin. DVT Prophylaxis: lovenox  Diet: DIET CARDIAC;   Diet NPO Time

## 2021-04-19 ENCOUNTER — ANESTHESIA EVENT (OUTPATIENT)
Dept: CARDIAC CATH/INVASIVE PROCEDURES | Age: 56
DRG: 274 | End: 2021-04-19
Payer: COMMERCIAL

## 2021-04-19 ENCOUNTER — TELEPHONE (OUTPATIENT)
Dept: CARDIOLOGY CLINIC | Age: 56
End: 2021-04-19

## 2021-04-19 ENCOUNTER — ANESTHESIA (OUTPATIENT)
Dept: CARDIAC CATH/INVASIVE PROCEDURES | Age: 56
DRG: 274 | End: 2021-04-19
Payer: COMMERCIAL

## 2021-04-19 VITALS — DIASTOLIC BLOOD PRESSURE: 52 MMHG | OXYGEN SATURATION: 97 % | SYSTOLIC BLOOD PRESSURE: 88 MMHG

## 2021-04-19 DIAGNOSIS — I47.29 NSVT (NONSUSTAINED VENTRICULAR TACHYCARDIA): Primary | ICD-10-CM

## 2021-04-19 LAB
ANION GAP SERPL CALCULATED.3IONS-SCNC: 12 MMOL/L (ref 3–16)
BUN BLDV-MCNC: 18 MG/DL (ref 7–20)
CALCIUM SERPL-MCNC: 9.3 MG/DL (ref 8.3–10.6)
CHLORIDE BLD-SCNC: 106 MMOL/L (ref 99–110)
CO2: 22 MMOL/L (ref 21–32)
CREAT SERPL-MCNC: 1.1 MG/DL (ref 0.9–1.3)
EKG ATRIAL RATE: 85 BPM
EKG DIAGNOSIS: NORMAL
EKG P AXIS: 85 DEGREES
EKG P-R INTERVAL: 152 MS
EKG Q-T INTERVAL: 366 MS
EKG QRS DURATION: 94 MS
EKG QTC CALCULATION (BAZETT): 435 MS
EKG R AXIS: 66 DEGREES
EKG T AXIS: 73 DEGREES
EKG VENTRICULAR RATE: 85 BPM
GFR AFRICAN AMERICAN: >60
GFR NON-AFRICAN AMERICAN: >60
GLUCOSE BLD-MCNC: 88 MG/DL (ref 70–99)
POC ACT LR: 139 SEC
POC ACT LR: 198 SEC
POC ACT LR: 232 SEC
POC ACT LR: 259 SEC
POC ACT LR: 262 SEC
POC ACT LR: 270 SEC
POC ACT LR: 270 SEC
POC ACT LR: 284 SEC
POTASSIUM REFLEX MAGNESIUM: 3.9 MMOL/L (ref 3.5–5.1)
SODIUM BLD-SCNC: 140 MMOL/L (ref 136–145)

## 2021-04-19 PROCEDURE — 2580000003 HC RX 258: Performed by: NURSE ANESTHETIST, CERTIFIED REGISTERED

## 2021-04-19 PROCEDURE — 93613 INTRACARDIAC EPHYS 3D MAPG: CPT | Performed by: INTERNAL MEDICINE

## 2021-04-19 PROCEDURE — 2580000003 HC RX 258: Performed by: INTERNAL MEDICINE

## 2021-04-19 PROCEDURE — 6370000000 HC RX 637 (ALT 250 FOR IP): Performed by: INTERNAL MEDICINE

## 2021-04-19 PROCEDURE — 93620 COMP EP EVL R AT VEN PAC&REC: CPT

## 2021-04-19 PROCEDURE — 2500000003 HC RX 250 WO HCPCS

## 2021-04-19 PROCEDURE — 93620 COMP EP EVL R AT VEN PAC&REC: CPT | Performed by: INTERNAL MEDICINE

## 2021-04-19 PROCEDURE — 93623 PRGRMD STIMJ&PACG IV RX NFS: CPT | Performed by: INTERNAL MEDICINE

## 2021-04-19 PROCEDURE — 93621 COMP EP EVL L PAC&REC C SINS: CPT | Performed by: INTERNAL MEDICINE

## 2021-04-19 PROCEDURE — 85347 COAGULATION TIME ACTIVATED: CPT

## 2021-04-19 PROCEDURE — C1759 CATH, INTRA ECHOCARDIOGRAPHY: HCPCS

## 2021-04-19 PROCEDURE — 6360000002 HC RX W HCPCS

## 2021-04-19 PROCEDURE — 4A0234Z MEASUREMENT OF CARDIAC ELECTRICAL ACTIVITY, PERCUTANEOUS APPROACH: ICD-10-PCS | Performed by: INTERNAL MEDICINE

## 2021-04-19 PROCEDURE — 93662 INTRACARDIAC ECG (ICE): CPT

## 2021-04-19 PROCEDURE — 93010 ELECTROCARDIOGRAM REPORT: CPT | Performed by: INTERNAL MEDICINE

## 2021-04-19 PROCEDURE — 02K83ZZ MAP CONDUCTION MECHANISM, PERCUTANEOUS APPROACH: ICD-10-PCS | Performed by: INTERNAL MEDICINE

## 2021-04-19 PROCEDURE — 6370000000 HC RX 637 (ALT 250 FOR IP): Performed by: NURSE ANESTHETIST, CERTIFIED REGISTERED

## 2021-04-19 PROCEDURE — 93621 COMP EP EVL L PAC&REC C SINS: CPT

## 2021-04-19 PROCEDURE — C1894 INTRO/SHEATH, NON-LASER: HCPCS

## 2021-04-19 PROCEDURE — 99233 SBSQ HOSP IP/OBS HIGH 50: CPT | Performed by: INTERNAL MEDICINE

## 2021-04-19 PROCEDURE — 6360000002 HC RX W HCPCS: Performed by: INTERNAL MEDICINE

## 2021-04-19 PROCEDURE — 6360000002 HC RX W HCPCS: Performed by: NURSE ANESTHETIST, CERTIFIED REGISTERED

## 2021-04-19 PROCEDURE — 2500000003 HC RX 250 WO HCPCS: Performed by: NURSE ANESTHETIST, CERTIFIED REGISTERED

## 2021-04-19 PROCEDURE — C1732 CATH, EP, DIAG/ABL, 3D/VECT: HCPCS

## 2021-04-19 PROCEDURE — 4A023FZ MEASUREMENT OF CARDIAC RHYTHM, PERCUTANEOUS APPROACH: ICD-10-PCS | Performed by: INTERNAL MEDICINE

## 2021-04-19 PROCEDURE — 1200000000 HC SEMI PRIVATE

## 2021-04-19 PROCEDURE — 80048 BASIC METABOLIC PNL TOTAL CA: CPT

## 2021-04-19 PROCEDURE — 93662 INTRACARDIAC ECG (ICE): CPT | Performed by: INTERNAL MEDICINE

## 2021-04-19 PROCEDURE — 93623 PRGRMD STIMJ&PACG IV RX NFS: CPT

## 2021-04-19 PROCEDURE — 2709999900 HC NON-CHARGEABLE SUPPLY

## 2021-04-19 PROCEDURE — C1730 CATH, EP, 19 OR FEW ELECT: HCPCS

## 2021-04-19 PROCEDURE — 93613 INTRACARDIAC EPHYS 3D MAPG: CPT

## 2021-04-19 RX ORDER — PROPOFOL 10 MG/ML
INJECTION, EMULSION INTRAVENOUS CONTINUOUS PRN
Status: DISCONTINUED | OUTPATIENT
Start: 2021-04-19 | End: 2021-04-19 | Stop reason: SDUPTHER

## 2021-04-19 RX ORDER — PROTAMINE SULFATE 10 MG/ML
INJECTION, SOLUTION INTRAVENOUS PRN
Status: DISCONTINUED | OUTPATIENT
Start: 2021-04-19 | End: 2021-04-19 | Stop reason: SDUPTHER

## 2021-04-19 RX ORDER — SODIUM CHLORIDE 9 MG/ML
INJECTION, SOLUTION INTRAVENOUS CONTINUOUS PRN
Status: DISCONTINUED | OUTPATIENT
Start: 2021-04-19 | End: 2021-04-19 | Stop reason: SDUPTHER

## 2021-04-19 RX ORDER — HEPARIN SODIUM 10000 [USP'U]/ML
INJECTION, SOLUTION INTRAVENOUS; SUBCUTANEOUS PRN
Status: DISCONTINUED | OUTPATIENT
Start: 2021-04-19 | End: 2021-04-19 | Stop reason: SDUPTHER

## 2021-04-19 RX ADMIN — Medication 10 ML: at 22:35

## 2021-04-19 RX ADMIN — HEPARIN SODIUM 5000 UNITS: 10000 INJECTION, SOLUTION INTRAVENOUS; SUBCUTANEOUS at 15:58

## 2021-04-19 RX ADMIN — HEPARIN SODIUM 2000 UNITS: 10000 INJECTION, SOLUTION INTRAVENOUS; SUBCUTANEOUS at 16:37

## 2021-04-19 RX ADMIN — PROPOFOL: 10 INJECTION, EMULSION INTRAVENOUS at 17:47

## 2021-04-19 RX ADMIN — PROTAMINE SULFATE 30 MG: 10 INJECTION, SOLUTION INTRAVENOUS at 18:17

## 2021-04-19 RX ADMIN — PHENYLEPHRINE HYDROCHLORIDE 50 MCG/MIN: 10 INJECTION, SOLUTION INTRAMUSCULAR; INTRAVENOUS; SUBCUTANEOUS at 17:31

## 2021-04-19 RX ADMIN — ASPIRIN 81 MG: 81 TABLET, CHEWABLE ORAL at 08:10

## 2021-04-19 RX ADMIN — ENOXAPARIN SODIUM 40 MG: 40 INJECTION SUBCUTANEOUS at 22:35

## 2021-04-19 RX ADMIN — Medication 10 ML: at 08:10

## 2021-04-19 RX ADMIN — HEPARIN SODIUM 5000 UNITS: 10000 INJECTION, SOLUTION INTRAVENOUS; SUBCUTANEOUS at 16:19

## 2021-04-19 RX ADMIN — SODIUM CHLORIDE: 9 INJECTION, SOLUTION INTRAVENOUS at 14:54

## 2021-04-19 RX ADMIN — DEXTRAN 70, AND HYPROMELLOSE 2910 2 DROP: 1; 3 SOLUTION/ DROPS OPHTHALMIC at 17:57

## 2021-04-19 RX ADMIN — ISOPROTERENOL HYDROCHLORIDE 2 MCG/MIN: 0.2 INJECTION, SOLUTION INTRAMUSCULAR; INTRAVENOUS at 16:19

## 2021-04-19 RX ADMIN — ATORVASTATIN CALCIUM 40 MG: 40 TABLET, FILM COATED ORAL at 22:35

## 2021-04-19 RX ADMIN — PROPOFOL 50 MCG/KG/MIN: 10 INJECTION, EMULSION INTRAVENOUS at 16:57

## 2021-04-19 RX ADMIN — HEPARIN SODIUM 2000 UNITS: 10000 INJECTION, SOLUTION INTRAVENOUS; SUBCUTANEOUS at 16:57

## 2021-04-19 ASSESSMENT — PULMONARY FUNCTION TESTS
PIF_VALUE: 1
PIF_VALUE: 2
PIF_VALUE: 1
PIF_VALUE: 0
PIF_VALUE: 1
PIF_VALUE: 0
PIF_VALUE: 1
PIF_VALUE: 0
PIF_VALUE: 1

## 2021-04-19 ASSESSMENT — PAIN DESCRIPTION - ORIENTATION: ORIENTATION: MID;LOWER

## 2021-04-19 ASSESSMENT — PAIN DESCRIPTION - LOCATION: LOCATION: BACK

## 2021-04-19 ASSESSMENT — PAIN DESCRIPTION - PAIN TYPE: TYPE: ACUTE PAIN

## 2021-04-19 ASSESSMENT — PAIN - FUNCTIONAL ASSESSMENT: PAIN_FUNCTIONAL_ASSESSMENT: ACTIVITIES ARE NOT PREVENTED

## 2021-04-19 ASSESSMENT — PAIN SCALES - GENERAL: PAINLEVEL_OUTOF10: 0

## 2021-04-19 NOTE — PROGRESS NOTES
Cardiology Consult Service  Daily Progress Note        Admit Date:  4/16/2021  Primary cardiologist: Dr Marcia Lopez / Dr. Franc Ho    Reason for Consultation/Chief Complaint: NSVT    Subjective:     Kyle Simpson is a 54 y.o. male with no significant past medical history. Patient started complaining of exertional palpitations, lightheadedness, shortness of breath and mild chest discomfort and his PCP schedule a GXT.     GXT 4/16/2021 (I personally supervised and reviewed ECG images): At rest, normal sinus rhythm, no evidence of ischemia. During stage I of GXT patient started developing episodes of NSVT which became more frequent and into recovery. He received 1 dose of Lopressor 5 mg IV and normal saline 500 mL bolus. During these episodes, patient felt lightheaded and short of breath, symptoms very similar to the ones he was complaining of. LHC 4/16/2021: Normal coronaries    Echo 4/17/2021: Normal    Interval history:  Patient was placed on flecainide, Toprol, verapamil. BP low, some of the meds were held, he continues to have NSVT. He is scheduled to have EP study and possible ablation later today.     Objective:     Medications:   [Held by provider] verapamil  80 mg Oral 3 times per day    sodium chloride flush  5-40 mL Intravenous 2 times per day    enoxaparin  40 mg Subcutaneous Q24H    aspirin  81 mg Oral Daily    atorvastatin  40 mg Oral Nightly    [Held by provider] metoprolol succinate  25 mg Oral Daily    [Held by provider] flecainide  100 mg Oral 2 times per day       IV drips:   sodium chloride         PRN:  sodium chloride flush, sodium chloride, promethazine **OR** ondansetron, polyethylene glycol, acetaminophen **OR** acetaminophen, perflutren lipid microspheres    Vitals:    04/18/21 2324 04/19/21 0424 04/19/21 0757 04/19/21 1217   BP: 106/64 108/68 108/77 114/78   Pulse: 61 58 71 68   Resp: 15 14 14 16   Temp: 97.9 °F (36.6 °C) 97.7 °F (36.5 °C) 97.9 °F (36.6 °C) 98.1 °F (36.7 °C)   TempSrc: Oral Oral Oral Oral   SpO2: 98% 95% 97% 99%   Weight:   202 lb 9.6 oz (91.9 kg)    Height:           Intake/Output Summary (Last 24 hours) at 4/19/2021 1623  Last data filed at 4/19/2021 0650  Gross per 24 hour   Intake 720 ml   Output 1450 ml   Net -730 ml     I/O last 3 completed shifts: In: 720 [P.O.:720]  Out: 1450 [Urine:1450]  Wt Readings from Last 3 Encounters:   04/19/21 202 lb 9.6 oz (91.9 kg)   03/22/21 212 lb (96.2 kg)   11/11/19 212 lb 12.8 oz (96.5 kg)       Admit Wt: Weight: 207 lb 6.4 oz (94.1 kg)   Todays Wt: Weight: 202 lb 9.6 oz (91.9 kg)    TELEMETRY: Sinus with NSVTs    Physical Exam:         General Appearance:  Alert, cooperative, no distress, appears stated age Appropriate weight   Head:  Normocephalic, without obvious abnormality, atraumatic   Eyes:  PERRL, conjunctiva/corneas clear EOM intact  Ears normal   Throat no lesions       Nose: Nares normal, no drainage or sinus tenderness   Throat: Lips, mucosa, and tongue normal   Neck: Supple, symmetrical, trachea midline, no adenopathy, thyroid: not enlarged, symmetric, no tenderness/mass/nodules, no carotid bruit. Lungs:   Normal respiratory rate, lungs clear to auscultation without any wheezes, rubs or ronchi bilaterally. Chest Wall:  No tenderness or deformity   Heart:  Regular rhythm, rate is controlled, S1, S2 normal, there is no murmur, there is no rub or gallop, no jvd, no bilateral lower extremity edema   Abdomen:   Soft, non-tender, bowel sounds active all four quadrants,  no masses, no organomegaly       Extremities: Extremities normal, atraumatic, no cyanosis.     Pulses: 2+ and symmetric   Skin: Skin color, texture, turgor normal, no rashes or lesions   Pysch: Normal mood and affect   Neurologic: Normal gross motor and sensory exam.  Cranial nerves intact       Labs:   Recent Labs     04/17/21  0508 04/19/21  0422    140   K 3.9 3.9   BUN 14 18   CREATININE 0.8* 1.1    106   CO2 22 22   GLUCOSE 87 88   CALCIUM 8.8 9.3     Recent Labs     04/17/21  0508   WBC 5.1   HGB 14.1   HCT 41.1      MCV 91.8     No results for input(s): CHOLTOT, TRIG, HDL in the last 72 hours. Invalid input(s): LIPIDCOMM, CHOLHDL, VLDCHOL, LDL  No results for input(s): PTT, INR in the last 72 hours. Invalid input(s): PT  No results for input(s): CKTOTAL, CKMB, CKMBINDEX, TROPONINI in the last 72 hours. No results for input(s): BNP in the last 72 hours. No results for input(s): NTPROBNP in the last 72 hours. No results for input(s): TSH in the last 72 hours. Imaging:       Assessment & Plan:     1. Nonsustained VT:  Patient had recurrent episodes of NSVT which were symptomatic. LHC and echo are within normal limits.     -Plan for EP study and possible ablation by Dr. Nickolas Garcia later today. I have spent 35 minutes of face to face time with the patient with more than 50% spent counseling and coordinating care. I have personally reviewed the reports and images of labs, radiological studies, cardiac studies including ECG's and telemetry, current and old medical records. The note was completed using EMR and Dragon dictation system. Every effort was made to ensure accuracy; however, inadvertent computerized transcription errors may be present. All questions and concerns were addressed to the patient/family. Alternatives to my treatment were discussed. Patient will be followed by Dr. Nickolas Garcia who will decide about meds and timing of his release home. I will now sign off. Thank you for the consult and please do not hesitate to contact me with any questions.      Patricia Durant MD, [facility] [place], [address]  The [address]  [facility] [address] [address]  Ph: [phone]  Fax: [phone]

## 2021-04-19 NOTE — PROCEDURES
bit longer(less than 30 seconds), he felt lightheadedness and shortness of breath. Hence, he was taken for cardiac catheterization lab for EP study and possible ablation. Details of Procedure: The risks, benefits, alternatives of procedure were explained to the patient. The risks, benefits and alternatives of the ablation procedure were discussed with the patient. The risks including, but not limited to, the risks of bleeding, infection, radiation exposure, injury to vascular, cardiac and surrounding structures (including pneumothorax), stroke, cardiac perforation, tamponade, need for emergent open heart surgery, need for pacemaker implantation, myocardial infarction and death were discussed in detail. The patient opted to proceed with the ablation. Written informed consent was signed and placed in the chart. The patient was brought to the electrophysiology lab in a fasting nonsedated state. Pre-sedation evaluation and airway assessment was completed. IV sedation was provided by our anesthesia colleagues. The patient was monitored continuously with ECG, pulse oximetry, blood pressure monitoring, and direct observation. His flecainide, verapamil and Toprol are on hold for the past 2 days. Secondary to concerns of disappearance of PVCs with sedation, we kept him on light sedation. He was predominantly on nitrous oxide to get the access. After injection of 2% lidocaine in the right groin, a 8F sheath was introduced to the right femoral vein and a two 6 [place] sheaths were introduced into the right femoral vein. A 4 [place] right femoral artery sheath was also introduced into the right femoral artery. A 8 [place] intracardiac echocardiogram catheter was advanced into the right atrium. Using CARTOSOUND module, anatomy of the right atrium, CS os, right ventricle, right ventricular outflow tract, pulmonary valve, RVOT, LVOT, aortic valve, LV was performed.   After this, at the canal catheter was advanced However, whenever I am coming on the impedance will go more than 250s and hence not able to deliver any energy. Ever impedance spike cutoff is 40 ohms and peak of 250 ohms. In spite of this changes, I was not able to deliver any energy in the area of interest secondary to energy cut off. Hence, because of the safety reasons I decided not to ablate. Longest ventricular tachycardia during the EP study was about 11 seconds. Sinus cycle length 724  RI interval 142 ms  QRS duration 90 ms  QT interval 430 ms  AH interval 70 ms  HV interval 43 ms  AV block cycle length 320 ms  VA block cycle length 440 ms    AThe patient tolerated the procedure well and there were no complications. Catheters and sheaths were removed. Since patient had received heparin and had high ACT, closure device was used for closing of the access site. Post-sedation evaluation was completed. Patient was transferred to the floor in stable condtion. Conclusion:   Electrogram wise, findings are consistent with arrhythmogenic right ventricle  No ablation was performed secondary to high impedance in the right ventricle (with concerns of cardiac perforation if we deliver radiofrequency energy with high impedance)    Plan:   Start him on amiodarone 200 mg p.o. twice daily  [facility]  Cardiac MRI as an outpatient    Thank you for allowing us to participate in the care of your patient. If you have any questions or comments, please do not hesitate to contact us.     Bernadine Adams MD   Cardiac Electrophysiology  [address] [place] [address] [phone]

## 2021-04-19 NOTE — CARE COORDINATION
Case Management Assessment           Initial Evaluation                Date / Time of Evaluation: 4/19/2021 12:13 PM                 Assessment Completed by: Richard Garland    Patient Name: Manoj De La Torre     YOB: 1965  Diagnosis: Abnormal stress test [R94.39]     Date / Time: 4/16/2021  1:11 PM    Patient Admission Status: Inpatient    If patient is discharged prior to next notation, then this note serves as note for discharge by case management. Current PCP: Valentine Paredes MD  Clinic Patient: No    Chart Reviewed: Yes  Patient/ Family Interviewed: Yes    Initial assessment completed at bedside with: patient    Hospitalization in the last 30 days: Yes    Emergency Contacts:  Extended Emergency Contact Information  Primary Emergency Contact: 9 Olga Kumar of [address] [place] [address] Phone: [phone]  Work Phone: [phone]  Relation: Brother/Sister  Secondary Emergency Contact: Leeanna 1827, [address] Phone: [phone]  Relation: Child  Preferred language: English   needed? No    Advance Directives:   Code Status: Full Code    Healthcare Power of : No      Financial  Payor: Ernesto Ramirez / Plan: [address] [place] [address] / Product Type: *No Product type* /     Pre-cert required for SNF: Yes    Pharmacy    [id number], [address] [facility]  [address] [facility] [address] [facility] [address]  Phone: [phone] Fax: [phone]      Potential assistance Purchasing Medications: Potential Assistance Purchasing Medications: No  Does Patient want to participate in local refill/ meds to beds program?: No    Meds To Beds General Rules:  1. Can ONLY be done Monday- Friday between 8:30am-5pm  2. Prescription(s) must be in pharmacy by 3pm to be filled same day  3. Copy of patient's insurance/ prescription drug card and patient face sheet must be sent along with the prescription(s)  4.  Cost of Rx cannot be added to time.    The Plan for Transition of Care is related to the following treatment goals of Abnormal stress test [R94.39]    The Patient and/or patient representative Crystal Gongora and his family were provided with a choice of provider and agrees with the discharge plan Yes    Freedom of choice list was provided with basic dialogue that supports the patient's individualized plan of care/goals and shares the quality data associated with the providers.  Yes    Care Transition patient: Yes    Latisha Espinoza [facility]  Case Management Department  Ph: [phone]   Fax: [phone]

## 2021-04-19 NOTE — PLAN OF CARE
S/p EP study  Patient was easily inducible nonsustained VT  Right ventricle with significant abnormal and fragmented electrograms  More so in the RVOT anterolateral wall, infundibulum and inflow closer towards the tricuspid valve / parahisian region  Intracardiac echocardiogram showed fibrofatty tissue of the anterior wall of the right ventricle  Few pouches were also noted in the anterolateral part of the right ventricle  Excellent electrograms and signals were noted in these pouches  However, whenever I have placed my ablation catheter inside his pouches, the impedance would go higher (which would increase the chance of cardiac perforation). Hence, due to safety reasons, radiofrequency ablation was not performed.   Right femoral arterial access -manual pressure was called  Right femoral venous access -MVP vascular closure device was placed  Hemostasis was achieved    Plan:    Started on amiodarone 200 mg p.o. twice daily  Resume the [facility]  Cardiac MRI as an outpatient, in a period of 1 week    I had a long discussion with his daughter over the phone  I will stop by and talk to the patient tomorrow morning    Toby Escobedo MD   Cardiac Electrophysiology  [address] [place] [address] [phone]

## 2021-04-19 NOTE — TELEPHONE ENCOUNTER
----- Message from Pia Almazan MD sent at 4/16/2021  5:30 PM EDT -----  Please arrange 2 weeks CAM monitor (he will be discharged from the hospital on Saturday)  Please arrange cardiac MRI as an outpatient, Indication nonsustained ventricular tachycardia (I have talked to Dr. Marleny Whitley also)    Follow-up with me in 6 weeks  Thanks

## 2021-04-19 NOTE — PROGRESS NOTES
[facility]   Cardiac Electrophysiology Progress Note     Admit Date: 2021     Reason for follow up: NSVT    HPI and Interval History:   Patient seen and examined. Clinical notes reviewed. Telemetry reviewed. No new complaint today. No major events overnight. Denies having chest pain, shortness of breath, dyspnea on exertion, Orthopnea, PND at the time of this visit. Review of System:  All other systems reviewed except for that noted above. Pertinent negatives and positives are:     · General: negative for fever, chills   · Ophthalmic ROS: negative for - eye pain or loss of vision  · ENT ROS: negative for - headaches, sore throat   · Respiratory: negative for - cough, sputum  · Cardiovascular: Reviewed in HPI  · Gastrointestinal: negative for - abdominal pain, diarrhea, N/V  · Hematology: negative for - bleeding, blood clots, bruising or jaundice  · Genito-Urinary:  negative for - Dysuria or incontinence  · Musculoskeletal: negative for - Joint swelling, muscle pain  · Neurological: negative for - confusion, dizziness, headaches   · Psychiatric: No anxiety, no depression. · Dermatological: negative for - rash      Physical Examination:  Vitals:    21 1217   BP: 114/78   Pulse: 68   Resp: 16   Temp: 98.1 °F (36.7 °C)   SpO2: 99%        Intake/Output Summary (Last 24 hours) at 2021 1416  Last data filed at 2021 0650  Gross per 24 hour   Intake 720 ml   Output 1450 ml   Net -730 ml     In: 720 [P.O.:720]  Out: 1450    Wt Readings from Last 3 Encounters:   21 202 lb 9.6 oz (91.9 kg)   21 212 lb (96.2 kg)   19 212 lb 12.8 oz (96.5 kg)     Temp  Av.9 °F (36.6 °C)  Min: 97.6 °F (36.4 °C)  Max: 98.4 °F (36.9 °C)  Pulse  Av  Min: 58  Max: 71  BP  Min: 106/64  Max: 114/78  SpO2  Av.8 %  Min: 95 %  Max: 100 %    · Telemetry: Sinus rhythm, NSVT, PVCs  · Constitutional: Alert. Oriented to person, place, and time. No distress.    · Head: Normocephalic and atraumatic. · Mouth/Throat: Lips appear moist. Oropharynx is clear and moist.  · Eyes: Conjunctivae normal. EOM are normal.   · Neck: Neck supple. No lymphadenopathy. No rigidity. No JVD present. · Cardiovascular: Normal rate, regular rhythm. Normal S1&S2. Carotid pulse 2+ bilaterally. · Pulmonary/Chest: Bilateral respiratory sounds present. No respiratory accessory muscle use. No wheezes, No rhonchi. · Abdominal: Soft. Normal bowel sounds present. No distension, No tenderness. No splenomegaly. No hernia. · Musculoskeletal: No tenderness. No edema    · Lymphadenopathy: Has no cervical adenopathy. · Neurological: Alert and oriented. Cranial nerve II-XII grossly intact, No gross deficit to touch. · Skin: Skin is warm and dry. No rash, lesions, ulcerations noted. · Psychiatric: No anxiety nor agitation. Labs, diagnostic and imaging results reviewed. Reviewed. Recent Labs     04/17/21  0508 04/19/21  0422    140   K 3.9 3.9    106   CO2 22 22   BUN 14 18   CREATININE 0.8* 1.1     Recent Labs     04/17/21  0508   WBC 5.1   HGB 14.1   HCT 41.1   MCV 91.8        No results found for: CKTOTAL, CKMB, CKMBINDEX, TROPONINI  Estimated Creatinine Clearance: 83 mL/min (based on SCr of 1.1 mg/dL).    No results found for: BNP  Lab Results   Component Value Date    PROTIME 13.3 04/16/2021    INR 1.14 04/16/2021     Lab Results   Component Value Date    CHOL 193 04/16/2021    HDL 39 04/16/2021    HDL 33 06/02/2010    TRIG 47 04/16/2021       Scheduled Meds:   [Held by provider] verapamil  80 mg Oral 3 times per day    sodium chloride flush  5-40 mL Intravenous 2 times per day    enoxaparin  40 mg Subcutaneous Q24H    aspirin  81 mg Oral Daily    atorvastatin  40 mg Oral Nightly    [Held by provider] metoprolol succinate  25 mg Oral Daily    [Held by provider] flecainide  100 mg Oral 2 times per day     Continuous Infusions:   sodium chloride       PRN Meds:sodium chloride flush, sodium chloride, promethazine **OR** ondansetron, polyethylene glycol, acetaminophen **OR** acetaminophen, perflutren lipid microspheres     Patient Active Problem List    Diagnosis Date Noted    Abnormal stress test 04/16/2021    Moderate episode of recurrent major depressive disorder ([facility] [address] 01/17/2017    Anxiety disorder 12/07/2012    Depression 05/12/2010      Active Hospital Problems    Diagnosis Date Noted    Abnormal stress test [R94.39] 04/16/2021       Assessment and Plan:   1. Nonsustained ventricular tachycardia  2. Frequent PVCs  3. Anxiety     Morphology is consistent with outflow tract ventricular tachycardia  Transition at V2/V3  Lead 1 negative  Normal echocardiogram, normal right ventricular function  Normal cardiac catheterization, no major epicardial coronary artery disease  More than likely, exiting from RVOT/LVOT    In spite of being on flecainide, Toprol and verapamil he continued to have recurrence of nonsustained ventricular tachycardia  When his runs are longer, he feels lightheadedness and shortness of breath  Discussed about the treatment options including antiarrhythmic therapy, EP study and possible ablation. Patient is willing to proceed    Monitored anesthesia care    Thank you for allowing me to participate in the care of this patient. If you have any questions, please do not hesitate to contact me. Tello Canales MD   Cardiac Electrophysiology  [address]    For any EP related issues after 5 PM, contact [facility] on call cardiology through .

## 2021-04-19 NOTE — PRE SEDATION
Sedation Pre-Procedure Note    Patient Name: Hussein Olguin   YOB: 1965  Room/Bed: 0597/4447-68  Medical Record Number: [id number]  Date: 4/19/2021   Time: 2:18 PM       Indication: Nonsustained ventricular tachycardia    Consent: I have discussed with the patient and/or the patient representative the indication, alternatives, and the possible risks and/or complications of the planned procedure and the anesthesia methods. The patient and/or patient representative appear to understand and agree to proceed. Vital Signs:   Vitals:    04/19/21 1217   BP: 114/78   Pulse: 68   Resp: 16   Temp: 98.1 °F (36.7 °C)   SpO2: 99%       Past Medical History:   has a past medical history of Anxiety, Chest pain, Depression, and PVC (premature ventricular contraction). Past Surgical History:   has a past surgical history that includes Colonoscopy (N/A, 2/1/2019). Medications:   Scheduled Meds:    [Held by provider] verapamil  80 mg Oral 3 times per day    sodium chloride flush  5-40 mL Intravenous 2 times per day    enoxaparin  40 mg Subcutaneous Q24H    aspirin  81 mg Oral Daily    atorvastatin  40 mg Oral Nightly    [Held by provider] metoprolol succinate  25 mg Oral Daily    [Held by provider] flecainide  100 mg Oral 2 times per day     Continuous Infusions:    sodium chloride       PRN Meds: sodium chloride flush, sodium chloride, promethazine **OR** ondansetron, polyethylene glycol, acetaminophen **OR** acetaminophen, perflutren lipid microspheres  Home Meds:   Prior to Admission medications    Medication Sig Start Date End Date Taking?  Authorizing Provider   Cholecalciferol (VITAMIN D3) 1000 units TABS Take 1 tablet by mouth daily 9/19/19  Yes Breanna Hampton MD   cetirizine (ZYRTEC) 10 MG tablet TAKE 1 TABLET BY MOUTH EVERY DAY 3/20/19  Yes Fatemeh Iniguez, DO   niacin 250 MG extended release capsule Take 250 mg by mouth nightly   Yes Historical Provider, MD   Multiple Vitamins-Minerals (THERAPEUTIC MULTIVITAMIN-MINERALS) tablet Take 1 tablet by mouth daily   Yes Historical Provider, MD   Omega-3 Fatty Acids (FISH OIL) 1000 MG CAPS Take 3,000 mg by mouth daily   Yes Historical Provider, MD   ibuprofen (ADVIL;MOTRIN) 800 MG tablet TAKE 1 TABLET BY MOUTH EVERY 8 HOURS AS NEEDED FOR PAIN TAKE WITH FOOD. 4/24/17  Yes Connie Chester MD   Fexofenadine HCl (ALLEGRA ALLERGY PO) Take by mouth every other day    Historical Provider, MD   triamcinolone (NASACORT ALLERGY 24HR) 55 MCG/ACT nasal inhaler 2 sprays by Nasal route nightly  Patient not taking: Reported on 3/22/2021 1/25/19   Derrick Olivo DO   sodium chloride (OCEAN) 0.65 % nasal spray 3 sprays by Nasal route three times daily  Patient not taking: Reported on 3/22/2021 1/25/19   Derrick Olivo DO     Coumadin Use Last 7 Days:  no  Antiplatelet drug therapy use last 7 days: no  Other anticoagulant use last 7 days: no  Additional Medication Information: None      Pre-Sedation Documentation and Exam:   I have personally completed a history, physical exam & review of systems for this patient (see notes). Vital signs have been reviewed (see flow sheet for vitals).     Mallampati Airway Assessment:  Mallampati Class I - (soft palate, fauces, uvula & anterior/posterior tonsillar pillars are visible)    Prior History of Anesthesia Complications:   none    ASA Classification:  Class 2 - A normal healthy patient with mild systemic disease    Sedation/ Anesthesia Plan:   intravenous sedation    Medications Planned:   Very minimal sedation, nitrous oxide inhalation    Patient is an appropriate candidate for plan of sedation: yes    Electronically signed by Addy Ellison MD on 4/19/2021 at 2:18 PM

## 2021-04-19 NOTE — PLAN OF CARE
Problem: Falls - Risk of:  Goal: Will remain free from falls  Description: Will remain free from falls  4/19/2021 0046 by Kaylah Kruse RN  Outcome: Ongoing  Note:   Orthostatic vital signs obtained at start of shift - see flowsheet for details. Pt does not meet criteria for orthostasis. Pt is a Med fall risk. See Ernestina Mclean Fall Score and ABCDS Injury Risk assessments. - Screening for Orthostasis AND not a [facility] Risk per LOPEZ/ABCDS: Pt bed is in low position, side rails up, call light and belongings are in reach. Fall risk light is on outside pts room. Pt encouraged to call for assistance as needed. Will continue with hourly rounds for PO intake, pain needs, toileting and repositioning as needed. Problem: Cardiac:  Goal: Ability to maintain vital signs within normal range will improve  Description: Ability to maintain vital signs within normal range will improve  4/19/2021 0046 by Kaylah Kruse RN  Outcome: Ongoing  Note:   Patient having occasional PVC's, no runs of v-tach. Patients verapamil and flecainide held by doctor for EP and possible ablation in the morning. Patient is aware of plan of care.

## 2021-04-19 NOTE — PROGRESS NOTES
Hospitalist Progress Note      PCP: Shyam Pyle MD    Date of Admission: 4/16/2021    Chief Complaint: Abnormal stress test    Hospital Course: Patient is 59-year-old gentleman came into hospital for stress test due to demented chest pain, palpitations and lightheadedness. During stress test patient had NSVT lasted for 1 to 2 minutes aborted by metoprolol push underwent to emergent left heart cath showed EF 55 to 60%, no obstructive CAD. Plan for EP study later today.       Subjective: Had 2 runs of V. tach back-to-back in the morning. Patient is asymptomatic, denies nausea, vomiting, chest pain, shortness of breath. Concerned about the EP study today. Answered questions. Medications:  Reviewed    Infusion Medications    sodium chloride       Scheduled Medications    [Held by provider] verapamil  80 mg Oral 3 times per day    sodium chloride flush  5-40 mL Intravenous 2 times per day    enoxaparin  40 mg Subcutaneous Q24H    aspirin  81 mg Oral Daily    atorvastatin  40 mg Oral Nightly    [Held by provider] metoprolol succinate  25 mg Oral Daily    [Held by provider] flecainide  100 mg Oral 2 times per day     PRN Meds: sodium chloride flush, sodium chloride, promethazine **OR** ondansetron, polyethylene glycol, acetaminophen **OR** acetaminophen, perflutren lipid microspheres      Intake/Output Summary (Last 24 hours) at 4/19/2021 0811  Last data filed at 4/19/2021 0650  Gross per 24 hour   Intake 960 ml   Output 1450 ml   Net -490 ml       Physical Exam Performed:    /77   Pulse 71   Temp 97.9 °F (36.6 °C) (Oral)   Resp 14   Ht 6' (1.829 m)   Wt 202 lb 9.6 oz (91.9 kg)   SpO2 97%   BMI 27.48 kg/m²     General appearance: No apparent distress, appears stated age and cooperative. HEENT: Pupils equal, round, and reactive to light. Conjunctivae/corneas clear. Neck: Supple, with full range of motion. No jugular venous distention. Trachea midline.   Respiratory:  Normal respiratory effort. Clear to auscultation, bilaterally without Rales/Wheezes/Rhonchi. Cardiovascular: Regular rate and rhythm with normal S1/S2 without murmurs, rubs or gallops. Abdomen: Soft, non-tender, non-distended with normal bowel sounds. Musculoskeletal: No clubbing, cyanosis or edema bilaterally. Full range of motion without deformity. Skin: Skin color, texture, turgor normal.  No rashes or lesions. Neurologic:  Neurovascularly intact without any focal sensory/motor deficits. Cranial nerves: II-XII intact, grossly non-focal.  Psychiatric: Alert and oriented, thought content appropriate, normal insight  Capillary Refill: Brisk,< 3 seconds   Peripheral Pulses: +2 palpable, equal bilaterally       Labs:   Recent Labs     04/16/21  1010 04/17/21  0508   WBC 4.4 5.1   HGB 14.2 14.1   HCT 41.3 41.1    217     Recent Labs     04/16/21  1010 04/17/21  0508 04/19/21  0422    140 140   K 4.3 3.9 3.9    108 106   CO2 22 22 22   BUN 15 14 18   CREATININE 1.0 0.8* 1.1   CALCIUM 8.9 8.8 9.3     Recent Labs     04/16/21  1010   AST 17   ALT 16   BILITOT 0.5   ALKPHOS 54     Recent Labs     04/16/21  1010   INR 1.14     No results for input(s): Rita Sapp in the last 72 hours. Urinalysis:    No results found for: Jerrlyn Ades, BACTERIA, RBCUA, BLOODU, [address], [place] [address]    Radiology:  No orders to display           Assessment/Plan:    Active Hospital Problems    Diagnosis Date Noted    Abnormal stress test [R94.39] 04/16/2021     #Nonsustained VT  S/p left heart cath 4/16 nonobstructive CAD  Echo EF 55%, no regional wall motion noted. VT 4 beats x2 on telemetry. Continue aspirin  Patient can have light breakfast before 7 AM n.p.o. afterwards. Holding verapamil, flecainide. Plan for EP study today late afternoon. #Hyperlipidemia  Continue statin.       DVT Prophylaxis: lovenox  Diet: Diet NPO Time Specified  Code Status: Full Code    PT/OT Eval Status: N/A    Dispo -pending EP study, cardiology recs    This chart was likely completed using voice recognition technology and may contain unintended grammatical , phraseology,and/or punctuation errors      Dali Juan MD

## 2021-04-19 NOTE — FLOWSHEET NOTE
04/19/21 1259   Encounter Summary   Services provided to: Patient   Referral/Consult From: Patient   Continue Visiting   (es 4/19 hcpoa)   Complexity of Encounter High   Length of Encounter 45 minutes   Advance Care Planning Yes   Advance Directives (For Healthcare)   Healthcare Directive Yes, patient has an advance directive for healthcare treatment   Type of Healthcare Directive Durable power of  for health care   Copy in Chart Yes, copy in [facility] Agent's Name [facility] Agent's Phone Number [phone]   If you are unable to speak for yourself, does your Healthcare Agent or Legal Spokesperson know your healthcare wishes? Yes   Patient was alert and oriented. He completed [address] paperwork. He named his daughter, Raimundo Griffin as his agent (tel [phone]). Patient signed the document. His signature was witnessed and document signed by Crystal Walsh and Michell Richards. Patient was provided with the original and 2 copies. A copy was faxed to Health Information Management Team and placed in the paper chart. Statement Selected

## 2021-04-19 NOTE — PROGRESS NOTES
Called report to Elton Garcia on [employer]. Pt going to 7691 from cath lab. All belongings taken to pts room.

## 2021-04-19 NOTE — PROGRESS NOTES
VSS. Ortho negative. Assessment completed. Scheduled meds given whole with water at this time. Pt has been NPO since 0700. Pt had two 4 beat runs of v-tach back to back this morning. Cardiology notified. No new orders at this time. Pt resting comfortably in bed. Call light within reach. Denies further needs at this time. Will continue to monitor.  Electronically signed by Archana Wells RN on 4/19/2021 at 8:21 AM

## 2021-04-20 ENCOUNTER — TELEPHONE (OUTPATIENT)
Dept: CARDIOLOGY CLINIC | Age: 56
End: 2021-04-20

## 2021-04-20 ENCOUNTER — NURSE ONLY (OUTPATIENT)
Dept: CARDIOLOGY CLINIC | Age: 56
End: 2021-04-20

## 2021-04-20 VITALS
DIASTOLIC BLOOD PRESSURE: 69 MMHG | OXYGEN SATURATION: 98 % | BODY MASS INDEX: 27.44 KG/M2 | TEMPERATURE: 98 F | HEIGHT: 72 IN | HEART RATE: 69 BPM | WEIGHT: 202.6 LBS | SYSTOLIC BLOOD PRESSURE: 108 MMHG | RESPIRATION RATE: 17 BRPM

## 2021-04-20 DIAGNOSIS — I47.20 VENTRICULAR TACHYCARDIA: Primary | ICD-10-CM

## 2021-04-20 DIAGNOSIS — I49.3 PVC'S (PREMATURE VENTRICULAR CONTRACTIONS): ICD-10-CM

## 2021-04-20 PROCEDURE — 6370000000 HC RX 637 (ALT 250 FOR IP): Performed by: INTERNAL MEDICINE

## 2021-04-20 PROCEDURE — 2580000003 HC RX 258: Performed by: INTERNAL MEDICINE

## 2021-04-20 PROCEDURE — 99232 SBSQ HOSP IP/OBS MODERATE 35: CPT | Performed by: NURSE PRACTITIONER

## 2021-04-20 RX ORDER — SODIUM CHLORIDE 0.9 % (FLUSH) 0.9 %
5-40 SYRINGE (ML) INJECTION EVERY 12 HOURS SCHEDULED
Status: DISCONTINUED | OUTPATIENT
Start: 2021-04-20 | End: 2021-04-20 | Stop reason: HOSPADM

## 2021-04-20 RX ORDER — METOPROLOL SUCCINATE 25 MG/1
25 TABLET, EXTENDED RELEASE ORAL DAILY
Status: DISCONTINUED | OUTPATIENT
Start: 2021-04-20 | End: 2021-04-20 | Stop reason: HOSPADM

## 2021-04-20 RX ORDER — AMIODARONE HYDROCHLORIDE 200 MG/1
200 TABLET ORAL DAILY
Qty: 30 TABLET | Refills: 1 | Status: SHIPPED | OUTPATIENT
Start: 2021-05-04 | End: 2021-06-25 | Stop reason: SDUPTHER

## 2021-04-20 RX ORDER — ACETAMINOPHEN 325 MG/1
650 TABLET ORAL EVERY 4 HOURS PRN
Status: DISCONTINUED | OUTPATIENT
Start: 2021-04-20 | End: 2021-04-20 | Stop reason: SDUPTHER

## 2021-04-20 RX ORDER — ASPIRIN 81 MG/1
81 TABLET, CHEWABLE ORAL DAILY
Qty: 30 TABLET | Refills: 3 | Status: SHIPPED | OUTPATIENT
Start: 2021-04-21 | End: 2021-08-31 | Stop reason: SDUPTHER

## 2021-04-20 RX ORDER — ATORVASTATIN CALCIUM 40 MG/1
40 TABLET, FILM COATED ORAL NIGHTLY
Qty: 30 TABLET | Refills: 3 | Status: SHIPPED | OUTPATIENT
Start: 2021-04-20 | End: 2021-07-14 | Stop reason: SDUPTHER

## 2021-04-20 RX ORDER — SODIUM CHLORIDE 9 MG/ML
25 INJECTION, SOLUTION INTRAVENOUS PRN
Status: DISCONTINUED | OUTPATIENT
Start: 2021-04-20 | End: 2021-04-20 | Stop reason: HOSPADM

## 2021-04-20 RX ORDER — METOPROLOL SUCCINATE 25 MG/1
25 TABLET, EXTENDED RELEASE ORAL DAILY
Qty: 30 TABLET | Refills: 3 | Status: SHIPPED | OUTPATIENT
Start: 2021-04-20 | End: 2021-07-14 | Stop reason: SDUPTHER

## 2021-04-20 RX ORDER — SODIUM CHLORIDE 0.9 % (FLUSH) 0.9 %
5-40 SYRINGE (ML) INJECTION PRN
Status: DISCONTINUED | OUTPATIENT
Start: 2021-04-20 | End: 2021-04-20 | Stop reason: HOSPADM

## 2021-04-20 RX ORDER — AMIODARONE HYDROCHLORIDE 200 MG/1
200 TABLET ORAL 2 TIMES DAILY
Qty: 28 TABLET | Refills: 0 | Status: SHIPPED | OUTPATIENT
Start: 2021-04-20 | End: 2021-06-03

## 2021-04-20 RX ORDER — AMIODARONE HYDROCHLORIDE 200 MG/1
200 TABLET ORAL 2 TIMES DAILY
Status: DISCONTINUED | OUTPATIENT
Start: 2021-04-20 | End: 2021-04-20 | Stop reason: HOSPADM

## 2021-04-20 RX ADMIN — Medication 10 ML: at 08:44

## 2021-04-20 RX ADMIN — Medication 10 ML: at 08:38

## 2021-04-20 RX ADMIN — ACETAMINOPHEN 650 MG: 325 TABLET ORAL at 11:20

## 2021-04-20 RX ADMIN — AMIODARONE HYDROCHLORIDE 200 MG: 200 TABLET ORAL at 08:38

## 2021-04-20 RX ADMIN — METOPROLOL SUCCINATE 25 MG: 25 TABLET, EXTENDED RELEASE ORAL at 11:16

## 2021-04-20 RX ADMIN — ASPIRIN 81 MG: 81 TABLET, CHEWABLE ORAL at 08:38

## 2021-04-20 ASSESSMENT — PAIN SCALES - GENERAL
PAINLEVEL_OUTOF10: 0
PAINLEVEL_OUTOF10: 3
PAINLEVEL_OUTOF10: 0

## 2021-04-20 NOTE — TELEPHONE ENCOUNTER
Monitor placed on pt today (4/20) prior to DC. MRI scheduled for 5/13 (arrival time of 0930, NPO p MN) and f/u with [facility] scheduled for 6/3/21. Left detailed VM with all this information. Asked pt to call back to confirm that he received the message.

## 2021-04-20 NOTE — PROGRESS NOTES
Patient refusing tele monitor at this time. Patient states that all it does is beep and he does not want to have to listen to the beeping all night. Instructed patient that the tele monitor is for his safety but we will leave off for now per his request.  Will check vitals routinely. Will apply monitor if patient condition warrants. No needs at this time. Patient resting comfortably in bed. Call light in reach. Bed alarm on. Will continue to monitor.    Electronically signed by Gwenn Heimlich, RN on 4/20/2021 at 1:12 AM

## 2021-04-20 NOTE — PROGRESS NOTES
Pt refusing tele at this time. Educated on importance of monitoring heart rate/rhythm but still refusing. Notified Dr. Anamika Gutiérrez via perfect serve.

## 2021-04-20 NOTE — PROGRESS NOTES
Patient in bed resting comfortably. Respirations steady and unlabored. No signs of respiratory or cardiac distress. No complaints of pain at this time. No needs at this time. Call light in reach and bed alarm in place. Will continue to monitor.   Electronically signed by Ludwig Miner RN on 4/20/2021 at 2:10 AM

## 2021-04-20 NOTE — DISCHARGE INSTR - OTHER ORDERS
[address] [place] [address]  Phone: [phone]             April 20, 2021    Patient: Emil Wyman   YOB: 1965   Date of Visit: 4/16/2021       To Whom It May Concern:    Cyndi Purvis was seen and treated in our facility  beginning 4/16/2021 until . He may return to work on 04/21/21 . Do not lift heavy weight for 1 week.        Sincerely,       Cheryl Cobian MD

## 2021-04-20 NOTE — PROGRESS NOTES
Patient is A&O x3.  RA, sat 98%. No complaints of pain or SOB. Respirations appear to be easy and unlabored. Lungs clear. Respirations easy with no complaints of cough. Cardiac monitor in place, current rhythm NSR. Lungs clear. No complaints of nausea/vomiting/diarrhea. Up with assist to the bathroom/BSC as needed. Voids per urinal.  Left AC PIV intact and flushed. Tolerating general diet. Right wrist and right groin puncture sites WNL. Plan of care and safety measures reviewed with the patient. Call light in reach and bed alarm in place. Will continue to monitor.   Electronically signed by Eliud Bryant RN on 4/19/2021 at 11:29 PM

## 2021-04-20 NOTE — ANESTHESIA PRE PROCEDURE
Department of Anesthesiology  Preprocedure Note       Name:  Madelyn Hill   Age:  54 y.o.  :  1965                                          MRN:  [id number]         Date:  2021      Surgeon: * No surgeons listed *    Procedure: * No procedures listed *    Medications prior to admission:   Prior to Admission medications    Medication Sig Start Date End Date Taking?  Authorizing Provider   Cholecalciferol (VITAMIN D3) 1000 units TABS Take 1 tablet by mouth daily 19  Yes Holly Mehta MD   cetirizine (ZYRTEC) 10 MG tablet TAKE 1 TABLET BY MOUTH EVERY DAY 3/20/19  Yes Raciel Due,    niacin 250 MG extended release capsule Take 250 mg by mouth nightly   Yes Historical Provider, MD   Multiple Vitamins-Minerals (THERAPEUTIC MULTIVITAMIN-MINERALS) tablet Take 1 tablet by mouth daily   Yes Historical Provider, MD   Omega-3 Fatty Acids (FISH OIL) 1000 MG CAPS Take 3,000 mg by mouth daily   Yes Historical Provider, MD   ibuprofen (ADVIL;MOTRIN) 800 MG tablet TAKE 1 TABLET BY MOUTH EVERY 8 HOURS AS NEEDED FOR PAIN TAKE WITH FOOD. 17  Yes Onelia Li MD   Fexofenadine HCl (ALLEGRA ALLERGY PO) Take by mouth every other day    Historical Provider, MD   triamcinolone (NASACORT ALLERGY 24HR) 55 MCG/ACT nasal inhaler 2 sprays by Nasal route nightly  Patient not taking: Reported on 3/22/2021 1/25/19   Derrick Olivo DO   sodium chloride (OCEAN) 0.65 % nasal spray 3 sprays by Nasal route three times daily  Patient not taking: Reported on 3/22/2021 1/25/19   Derrick Olivo DO       Current medications:    Current Facility-Administered Medications   Medication Dose Route Frequency Provider Last Rate Last Admin    [Held by provider] verapamil (CALAN) tablet 80 mg  80 mg Oral 3 times per day KARINA Louise - CNP   80 mg at 21 0746    sodium chloride flush 0.9 % injection 5-40 mL  5-40 mL Intravenous 2 times per day Gabbie Bradshaw MD   10 mL at 21 0810    sodium chloride flush 0.9 % injection 5-40 mL  5-40 mL Intravenous PRN Steph Bautista MD        0.9 % sodium chloride infusion  25 mL Intravenous PRN Steph Bautista MD        enoxaparin (LOVENOX) injection 40 mg  40 mg Subcutaneous Q24H Steph Bautista MD   40 mg at 04/18/21 1822    promethazine (PHENERGAN) tablet 12.5 mg  12.5 mg Oral Q6H PRN Steph Bautista MD        Or    [facility] injection 4 mg  4 mg Intravenous Q6H PRN Steph Bautista MD        polyethylene glycol (GLYCOLAX) packet 17 g  17 g Oral Daily PRN Steph Bautista MD        acetaminophen (TYLENOL) tablet 650 mg  650 mg Oral Q6H PRN Steph Bautista MD        Or    acetaminophen (TYLENOL) suppository 650 mg  650 mg Rectal Q6H PRN Steph Bautista MD        aspirin chewable tablet 81 mg  81 mg Oral Daily Steph Bautista MD   81 mg at 04/19/21 0810    atorvastatin (LIPITOR) tablet 40 mg  40 mg Oral Nightly Steph Bautista MD   40 mg at 04/18/21 2001    [Held by provider] metoprolol succinate (TOPROL XL) extended release tablet 25 mg  25 mg Oral Daily Steph Bautista MD   25 mg at 04/18/21 6531    perflutren lipid microspheres (DEFINITY) injection 1.65 mg  1.5 mL Intravenous ONCE PRN Steph Bautista MD        [Held by provider] flecainide [facility]) tablet 100 mg  100 mg Oral 2 times per day Alfredo Hollis MD   100 mg at 04/17/21 2052       Allergies:  No Known Allergies    Problem List:    Patient Active Problem List   Diagnosis Code    Depression F32.9    Anxiety disorder F41.9    Moderate episode of recurrent major depressive disorder ([facility] [address] F33.1    Abnormal stress test R94.39    NSVT (nonsustained ventricular tachycardia) ([facility]) I47.2       Past Medical History:        Diagnosis Date    Anxiety     Chest pain     Depression     PVC (premature ventricular contraction)        Past Surgical History:        Procedure Laterality Date    COLONOSCOPY N/A 2/1/2019    COLONOSCOPY performed by Armando Del Castillo MD at [address] History: Social History     Tobacco Use    Smoking status: Never Smoker    Smokeless tobacco: Never Used    Tobacco comment: non smoker   Substance Use Topics    Alcohol use: No                                Counseling given: Not Answered  Comment: non smoker      Vital Signs (Current):   Vitals:    04/19/21 0424 04/19/21 0757 04/19/21 1217 04/19/21 2033   BP: 108/68 108/77 114/78 110/66   Pulse: 58 71 68 61   Resp: 14 14 16 18   Temp: 97.7 °F (36.5 °C) 97.9 °F (36.6 °C) 98.1 °F (36.7 °C) 98.4 °F (36.9 °C)   TempSrc: Oral Oral Oral Oral   SpO2: 95% 97% 99% 99%   Weight:  202 lb 9.6 oz (91.9 kg)     Height:                                                  BP Readings from Last 3 Encounters:   04/19/21 110/66   04/19/21 (!) 88/52   03/22/21 (!) 104/56       NPO Status:                                                                                 BMI:   Wt Readings from Last 3 Encounters:   04/19/21 202 lb 9.6 oz (91.9 kg)   03/22/21 212 lb (96.2 kg)   11/11/19 212 lb 12.8 oz (96.5 kg)     Body mass index is 27.48 kg/m². CBC:   Lab Results   Component Value Date    WBC 5.1 04/17/2021    RBC 4.48 04/17/2021    HGB 14.1 04/17/2021    HCT 41.1 04/17/2021    MCV 91.8 04/17/2021    RDW 13.7 04/17/2021     04/17/2021       CMP:   Lab Results   Component Value Date     04/19/2021    K 3.9 04/19/2021     04/19/2021    CO2 22 04/19/2021    BUN 18 04/19/2021    CREATININE 1.1 04/19/2021    GFRAA >60 04/19/2021    AGRATIO 1.7 04/16/2021    LABGLOM >60 04/19/2021    GLUCOSE 88 04/19/2021    PROT 6.8 04/16/2021    CALCIUM 9.3 04/19/2021    BILITOT 0.5 04/16/2021    ALKPHOS 54 04/16/2021    AST 17 04/16/2021    ALT 16 04/16/2021       POC Tests: No results for input(s): POCGLU, POCNA, POCK, POCCL, POCBUN, POCHEMO, POCHCT in the last 72 hours.     Coags:   Lab Results   Component Value Date    PROTIME 13.3 04/16/2021    INR 1.14 04/16/2021       HCG (If Applicable): No results found for: PREGTESTUR, PREGSERUM, HCG,

## 2021-04-20 NOTE — TELEPHONE ENCOUNTER
Left message for patient re: cardiac MRI scheduled for     May 13th @10 am arrival at 9:30 am    Nothing to eat or drink after midnight.

## 2021-04-20 NOTE — DISCHARGE SUMMARY
ocular muscles intact. Conjunctivae/corneas clear. Neck: Supple, with full range of motion. No jugular venous distention. Trachea midline. Respiratory:  Normal respiratory effort. Clear to auscultation, bilaterally without Rales/Wheezes/Rhonchi. Cardiovascular:  Regular rate and rhythm with normal S1/S2 without murmurs, rubs or gallops. Abdomen: Soft, non-tender, non-distended with normal bowel sounds. Musculoskeletal:  No clubbing, cyanosis or edema bilaterally. Full range of motion without deformity. Cath site without bleeding or hematoma  Skin: Skin color, texture, turgor normal.  No rashes or lesions. Neurologic:  Neurovascularly intact without any focal sensory/motor deficits. Cranial nerves: II-XII intact, grossly non-focal.  Psychiatric:  Alert and oriented, thought content appropriate, normal insight  Capillary Refill: Brisk,< 3 seconds   Peripheral Pulses: +2 palpable, equal bilaterally       Labs:  For convenience and continuity at follow-up the following most recent labs are provided:      CBC:    Lab Results   Component Value Date    WBC 5.1 04/17/2021    HGB 14.1 04/17/2021    HCT 41.1 04/17/2021     04/17/2021       Renal:    Lab Results   Component Value Date     04/19/2021    K 3.9 04/19/2021     04/19/2021    CO2 22 04/19/2021    BUN 18 04/19/2021    CREATININE 1.1 04/19/2021    CALCIUM 9.3 04/19/2021         Significant Diagnostic Studies    Radiology:   No orders to display          Consults:     IP CONSULT TO CARDIOLOGY    Disposition: Home    Condition at Discharge: Stable    Discharge Instructions/Follow-up:  Take medication as directed  Schedule cardiac MRI  Follow up with cardiology  Follow up with PCP    Code Status:  Full Code     Activity: activity as tolerated    Diet: cardiac diet      Discharge Medications:     Current Discharge Medication List           Details   !! amiodarone (CORDARONE) 200 MG tablet Take 1 tablet by mouth 2 times daily for 14 days  Qty: 28 tablet, Refills: 0      !! amiodarone (CORDARONE) 200 MG tablet Take 1 tablet by mouth daily  Qty: 30 tablet, Refills: 1      metoprolol succinate (TOPROL XL) 25 MG extended release tablet Take 1 tablet by mouth daily  Qty: 30 tablet, Refills: 3      atorvastatin (LIPITOR) 40 MG tablet Take 1 tablet by mouth nightly  Qty: 30 tablet, Refills: 3      aspirin 81 MG chewable tablet Take 1 tablet by mouth daily  Qty: 30 tablet, Refills: 3       !! - Potential duplicate medications found. Please discuss with provider. Details   Cholecalciferol (VITAMIN D3) 1000 units TABS Take 1 tablet by mouth daily  Qty: 90 tablet, Refills: 0      niacin 250 MG extended release capsule Take 250 mg by mouth nightly      Multiple Vitamins-Minerals (THERAPEUTIC MULTIVITAMIN-MINERALS) tablet Take 1 tablet by mouth daily      Omega-3 Fatty Acids (FISH OIL) 1000 MG CAPS Take 3,000 mg by mouth daily      ibuprofen (ADVIL;MOTRIN) 800 MG tablet TAKE 1 TABLET BY MOUTH EVERY 8 HOURS AS NEEDED FOR PAIN TAKE WITH FOOD. Qty: 60 tablet, Refills: 1      Fexofenadine HCl (ALLEGRA ALLERGY PO) Take by mouth every other day             Time Spent on discharge is more than 30 minutes in the examination, evaluation, counseling and review of medications and discharge plan. Signed:    Layo Perry MD   4/20/2021      Thank you Nolberto Rocha MD for the opportunity to be involved in this patient's care. If you have any questions or concerns please feel free to contact me at [phone].

## 2021-04-20 NOTE — PROGRESS NOTES
Electrophysiology - PROGRESS NOTE    Admit Date: 4/16/2021     Chief Complaint: Palpitations, REGALADO, VT     Interval History:   Patient seen and examined and notes reviewed. Patient is being followed for Palpitations, REGALADO, VT. patient with ongoing palpitations dyspnea on exertion, dizziness and lightheadedness over the past year progressing to the point where he came to the emergency room. He was noted to have NSVT on telemetry, he underwent a left heart cath that showed no significant coronary artery disease and his EF was 55% per echo. He was placed on flecainide for NSVT however there was no change and verapamil was added. He continued to have episodes of NSVT and underwent an EP study on 4/19/2020. RFA was not able to be performed due to high impedance noted during mapping. Patient was placed on amiodarone and taken off of flecainide. Plans are to do an outpatient cardiac MRI along with a 2-week CAM monitor. In: 1200 [P.O.:400;  I.V.:800]  Out: 600    Wt Readings from Last 2 Encounters:   04/19/21 202 lb 9.6 oz (91.9 kg)   03/22/21 212 lb (96.2 kg)       Data:   Scheduled Meds:   Scheduled Meds:   metoprolol succinate  25 mg Oral Daily    amiodarone  200 mg Oral BID    sodium chloride flush  5-40 mL Intravenous 2 times per day    [Held by provider] verapamil  80 mg Oral 3 times per day    sodium chloride flush  5-40 mL Intravenous 2 times per day    enoxaparin  40 mg Subcutaneous Q24H    aspirin  81 mg Oral Daily    atorvastatin  40 mg Oral Nightly     Continuous Infusions:   sodium chloride      sodium chloride       PRN Meds:.sodium chloride flush, sodium chloride, sodium chloride flush, sodium chloride, promethazine **OR** ondansetron, polyethylene glycol, acetaminophen **OR** acetaminophen, perflutren lipid microspheres  Continuous Infusions:   sodium chloride      sodium chloride         Intake/Output Summary (Last 24 hours) at Palpitations  3. REGALADO    53 y/o man with a h/o depression, anxiety, CP who p/w lightheadedness, dizziness, REGALADO, palpitations, ongoing x the past year, noted to have NSVT on tele, s/p LHC that showed no sig CAD, EF 55% per echo, placed on flecainide with no change in NSVT, verapamil added and no change, s/p EPS 4/19/2020, unable to ablate d/t high impedence, placed on amio. NSVT  - Not on tele  - On amio 200 mg BID x 2 weeks then 200 mg QD  - On Toprol XL 25 mg QD  - 2 week CAM outpatient  - Cardiac MRI outpatient   - No LIfeVest - reviewed with Dr. Tati Batres  - F/u with Dr. Tati Batres in 4-6 weeks after testing.     CAD  - Minor per cath  - On ASA, statin, BB    Kasie Dillon [facility] 115

## 2021-04-20 NOTE — ANESTHESIA POSTPROCEDURE EVALUATION
Department of Anesthesiology  Postprocedure Note    Patient: Yvette Sainz  MRN: [id number]  YOB: 1965  Date of evaluation: 4/19/2021  Time:  9:07 PM     Procedure Summary     Date: 04/19/21 Room / Location: [facility] Cath Lab    Anesthesia Start: 8035 Anesthesia Stop: 1901    Procedure: CATH LAB WITH ANESTHESIA Diagnosis:     Scheduled Providers: Candace De La Cruz MD; KARINA Randall - CRNA Responsible Provider: Jessica Fay DO    Anesthesia Type: Not recorded ASA Status: Not recorded          Anesthesia Type: No value filed. Waldemar Phase I:      Waldemar Phase II:      Last vitals: Reviewed and per EMR flowsheets.        Anesthesia Post Evaluation    Patient location during evaluation: bedside  Patient participation: complete - patient participated  Level of consciousness: awake and alert  Airway patency: patent  Nausea & Vomiting: no nausea and no vomiting  Complications: no  Respiratory status: acceptable  Hydration status: stable

## 2021-04-21 ENCOUNTER — TELEPHONE (OUTPATIENT)
Dept: INTERNAL MEDICINE CLINIC | Age: 56
End: 2021-04-21

## 2021-04-21 NOTE — TELEPHONE ENCOUNTER
[facility] Transitions Initial Follow Up Call    Outreach made within 2 business days of discharge: Yes    Patient: Ronny Carroll Patient : 1965   MRN: [id number]  Reason for Admission: There are no discharge diagnoses documented for the most recent discharge. Discharge Date: 21       Spoke with: Left voicemail for patient to call office with questions, concerns and to schedule HFU.     Discharge department/facility: [facility]    Scheduled appointment with PCP within 7-14 days    Follow Up  Future Appointments   Date Time Provider Sharon Patton   2021 10:00 AM [facility] MRI  2 [facility] MRI [facility] [place]   6/3/2021  3:45 PM [facility]       [place], [place]

## 2021-04-30 ENCOUNTER — NURSE ONLY (OUTPATIENT)
Dept: CARDIOLOGY CLINIC | Age: 56
End: 2021-04-30

## 2021-04-30 PROCEDURE — 93228 REMOTE 30 DAY ECG REV/REPORT: CPT | Performed by: INTERNAL MEDICINE

## 2021-04-30 NOTE — PROGRESS NOTES
Patient came in this afternoon to get the replacement CAM placed, his first one placed at the hospital came off. Placed 2nd 14 day CAM and explained care and mailing instructions to him.  He confirmed understanding

## 2021-05-04 ENCOUNTER — TELEPHONE (OUTPATIENT)
Dept: CARDIOLOGY CLINIC | Age: 56
End: 2021-05-04

## 2021-05-04 NOTE — TELEPHONE ENCOUNTER
Patient called back with questions about second 14-day CAM monitor placed today. 1. Will information be received from monitor before his 6/3/21 appointment with Dr. Tati Batres? 2. Does he really have to wear for 14 days since he wore first monitor for 8 days before it fell off? Please call him about this at [phone]. Thanks.

## 2021-05-13 ENCOUNTER — HOSPITAL ENCOUNTER (OUTPATIENT)
Dept: MRI IMAGING | Age: 56
Discharge: HOME | End: 2021-05-13
Payer: COMMERCIAL

## 2021-05-13 DIAGNOSIS — I47.29 NSVT (NONSUSTAINED VENTRICULAR TACHYCARDIA): ICD-10-CM

## 2021-05-13 LAB
LV EF: 64 %
LVEF MODALITY: NORMAL

## 2021-05-13 PROCEDURE — A9585 GADOBUTROL INJECTION: HCPCS | Performed by: INTERNAL MEDICINE

## 2021-05-13 PROCEDURE — 75561 CARDIAC MRI FOR MORPH W/DYE: CPT

## 2021-05-13 PROCEDURE — 6360000004 HC RX CONTRAST MEDICATION: Performed by: INTERNAL MEDICINE

## 2021-05-13 RX ADMIN — GADOBUTROL 14 ML: 604.72 INJECTION INTRAVENOUS at 11:02

## 2021-05-21 DIAGNOSIS — I47.29 NSVT (NONSUSTAINED VENTRICULAR TACHYCARDIA): ICD-10-CM

## 2021-06-02 NOTE — PROGRESS NOTES
[facility]   Cardiac Electrophysiology Consultation   Date: 6/2/2021  Reason for Consultation:  NSVT  Consult Requesting Physician: No att. providers found     Chief Complaint: No chief complaint on file. HPI: Kyle Simpson is a 54 y.o. male with no significant PMH. However, he had complaints of palpitations, REGALADO, and lightheadedness for the past year. He was found to have frequent NSVT during the exercise part of stress testing, taken urgently for LHC that showed no significant CAD. EF normal at 55% per echo. He was placed on flecainide for NSVT; however, there was no change and verapamil was added. He continued to have episodes of NSVT and underwent an EP study on 4/19/21. RFA was not able to be performed due to high impedance noted during mapping. Patient was taken off flecainide, started on amiodarone, and sent home with a LifeVest.  7 day CAM showed SR with very rare PVC's, but no NSVT. Interval History: Today, he is here for hospital f/u and to review recent monitor. He works as a .    Past Medical History:   Diagnosis Date    Anxiety     Chest pain     Depression     PVC (premature ventricular contraction)         Past Surgical History:   Procedure Laterality Date    COLONOSCOPY N/A 2/1/2019    COLONOSCOPY performed by Rubio Parra MD at [address] [place] [address]       Allergies:  No Known Allergies    Medication:   Prior to Admission medications    Medication Sig Start Date End Date Taking?  Authorizing Provider   amiodarone (CORDARONE) 200 MG tablet Take 1 tablet by mouth 2 times daily for 14 days 4/20/21 5/4/21  Elena Vila MD   amiodarone (CORDARONE) 200 MG tablet Take 1 tablet by mouth daily 5/4/21   Elena Vila MD   metoprolol succinate (TOPROL XL) 25 MG extended release tablet Take 1 tablet by mouth daily 4/20/21   Elena Vila MD   atorvastatin (LIPITOR) 40 MG tablet Take 1 tablet by mouth nightly 4/20/21   Elena Vila MD   aspirin 81 MG difficulty/pain  · Genito-Urinary ROS: negative for - dysuria or incontinence  · Musculoskeletal ROS: negative for - joint swelling or muscle pain  · Neurological ROS: negative for - confusion, dizziness, gait disturbance, headaches, numbness/tingling, seizures, speech problems, tremors, visual changes or weakness  · Dermatological ROS: negative for - rash    Physical Examination:  There were no vitals filed for this visit. · Constitutional: Oriented. No distress. · Head: Normocephalic and atraumatic. · Mouth/Throat: Oropharynx is clear and moist.   · Eyes: Conjunctivae normal. EOM are normal.   · Neck: Normal range of motion. Neck supple. No rigidity. No JVD present. · Cardiovascular: Normal rate, regular rhythm, S1&S2 and intact distal pulses. · Pulmonary/Chest: Bilateral respiratory sounds. No wheezes. No rhonchi. · Abdominal: Soft. Bowel sounds present. No distension, No tenderness. · Musculoskeletal: No tenderness. No edema    · Lymphadenopathy: Has no cervical adenopathy. · Neurological: Alert and oriented. Cranial nerve appears intact, No Gross deficit   · Skin: Skin is warm and dry. No rash noted. · Psychiatric: Has a normal mood, affect and behavior     Labs:  Reviewed. ECG: reviewed, ***Sinus  Rhythm, with QRS duration *** ms. No pathologic Q waves, ventricular pre-excitation, or QT prolongation. Studies:   1. 7 day CAM (4/30-5/7/21):  SR with average HR 66 (), 1 run of AT lasting 4 beats, rare PVC (0.12%), PAC (0.08%), no NSVT    2. Echo 4/17/21:   Summary   Left ventricular cavity size is normal. Normal left ventricular wall   thickness. Left ventricular function is normal with ejection fraction   estimated at 55%. Septal bounce noted. Normal diastolic function. Estimated   pulmonary artery systolic pressure is at 27 mmHg assuming a right atrial   pressure of 3 mmHg. 3. Stress Test:      4.  Cath 4/16/21:   Conclusions:  -No obstructive CAD  -Normal LVEF    The OT, echocardiogram, stress test, and coronary angiography/PCI were reviewed by myself and used for my plan of care. Procedures:  1. Assessment/Plan:   1. NSVT  2. PVCs  3. 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. All questions and concerns were addressed to the patient/family. Alternatives to my treatment were discussed.          ***    Aidan Morales MD  Cardiac Electrophysiology  [facility]

## 2021-06-03 ENCOUNTER — TELEPHONE (OUTPATIENT)
Dept: INTERNAL MEDICINE CLINIC | Age: 56
End: 2021-06-03

## 2021-06-03 ENCOUNTER — OFFICE VISIT (OUTPATIENT)
Dept: CARDIOLOGY CLINIC | Age: 56
End: 2021-06-03
Payer: COMMERCIAL

## 2021-06-03 VITALS
WEIGHT: 212.8 LBS | SYSTOLIC BLOOD PRESSURE: 110 MMHG | DIASTOLIC BLOOD PRESSURE: 80 MMHG | HEART RATE: 80 BPM | BODY MASS INDEX: 28.86 KG/M2

## 2021-06-03 DIAGNOSIS — Z79.899 ON AMIODARONE THERAPY: ICD-10-CM

## 2021-06-03 DIAGNOSIS — I47.29 NSVT (NONSUSTAINED VENTRICULAR TACHYCARDIA): ICD-10-CM

## 2021-06-03 DIAGNOSIS — N52.9 ERECTILE DYSFUNCTION, UNSPECIFIED ERECTILE DYSFUNCTION TYPE: Primary | ICD-10-CM

## 2021-06-03 DIAGNOSIS — F41.9 ANXIETY DISORDER, UNSPECIFIED TYPE: ICD-10-CM

## 2021-06-03 DIAGNOSIS — I49.3 PVC'S (PREMATURE VENTRICULAR CONTRACTIONS): ICD-10-CM

## 2021-06-03 DIAGNOSIS — I47.29 NSVT (NONSUSTAINED VENTRICULAR TACHYCARDIA) (HCC): ICD-10-CM

## 2021-06-03 DIAGNOSIS — Z79.899 ON AMIODARONE THERAPY: Primary | ICD-10-CM

## 2021-06-03 LAB
ALBUMIN SERPL-MCNC: 4.5 G/DL (ref 3.4–5)
ALP BLD-CCNC: 50 U/L (ref 40–129)
ALT SERPL-CCNC: 24 U/L (ref 10–40)
AST SERPL-CCNC: 43 U/L (ref 15–37)
BILIRUB SERPL-MCNC: 0.3 MG/DL (ref 0–1)
BILIRUBIN DIRECT: <0.2 MG/DL (ref 0–0.3)
BILIRUBIN, INDIRECT: ABNORMAL MG/DL (ref 0–1)
TOTAL PROTEIN: 6.6 G/DL (ref 6.4–8.2)
TSH REFLEX: 3.24 UIU/ML (ref 0.27–4.2)

## 2021-06-03 PROCEDURE — 99214 OFFICE O/P EST MOD 30 MIN: CPT | Performed by: INTERNAL MEDICINE

## 2021-06-03 PROCEDURE — 93000 ELECTROCARDIOGRAM COMPLETE: CPT | Performed by: INTERNAL MEDICINE

## 2021-06-03 RX ORDER — SILDENAFIL 100 MG/1
TABLET, FILM COATED ORAL
Qty: 30 TABLET | Refills: 1 | Status: SHIPPED | OUTPATIENT
Start: 2021-06-03 | End: 2021-06-07 | Stop reason: SDUPTHER

## 2021-06-03 RX ORDER — CETIRIZINE HYDROCHLORIDE 10 MG/1
10 TABLET ORAL DAILY
COMMUNITY

## 2021-06-03 NOTE — PROGRESS NOTES
[address]   Cardiac Electrophysiology Consultation   Date: 6/3/2021  Reason for Consultation:  NSVT  Consult Requesting Physician: No att. providers found     Chief Complaint:   Chief Complaint   Patient presents with    Follow-up     6 week Hospital Follow up/ Cam and MRI       HPI: Bhavya Wong is a 54 y.o. male with no significant PMH. However, he had complaints of palpitations, REGALADO, and lightheadedness for the past year. He was found to have frequent NSVT during the exercise part of stress testing, taken urgently for LHC that showed no significant CAD. EF normal at 55% per echo. He was placed on flecainide for NSVT; however, there was no change and verapamil was added. He continued to have episodes of NSVT and underwent an EP study on 4/19/21. RFA was not able to be performed due to high impedance noted during mapping. Patient was taken off flecainide, started on amiodarone, and sent home with a LifeVest.  7 day CAM showed SR with very rare PVC's, but no NSVT. Interval History: Today, he is here for hospital f/u and to review recent monitor. He presents in SR (73). He reports that he is feeling a lot better and has been able to work out recently. He works as a .    Past Medical History:   Diagnosis Date    Anxiety     Chest pain     Depression     PVC (premature ventricular contraction)         Past Surgical History:   Procedure Laterality Date    COLONOSCOPY N/A 2/1/2019    COLONOSCOPY performed by Cailin Burnham MD at [address] [place] [address]       Allergies:  No Known Allergies    Medication:   Prior to Admission medications    Medication Sig Start Date End Date Taking? Authorizing Provider   sildenafil (VIAGRA) 100 MG tablet Take 50mg or 100mg orally at least 30 minutes prior to sexual activity.  6/3/21  Yes Mary Records, MD   cetirizine (ZYRTEC) 10 MG tablet Take 10 mg by mouth daily   Yes Historical Provider, MD   amiodarone (CORDARONE) 200 MG tablet Take 1 tablet by mouth daily 5/4/21  Yes Roger Dancer, MD   metoprolol succinate (TOPROL XL) 25 MG extended release tablet Take 1 tablet by mouth daily 4/20/21  Yes Roger Dancer, MD   atorvastatin (LIPITOR) 40 MG tablet Take 1 tablet by mouth nightly 4/20/21  Yes Roger Dancer, MD   aspirin 81 MG chewable tablet Take 1 tablet by mouth daily 4/21/21  Yes Roger Dancer, MD   Cholecalciferol (VITAMIN D3) 1000 units TABS Take 1 tablet by mouth daily 9/19/19  Yes Justo Moritz, MD   niacin 250 MG extended release capsule Take 250 mg by mouth nightly   Yes Historical Provider, MD   Multiple Vitamins-Minerals (THERAPEUTIC MULTIVITAMIN-MINERALS) tablet Take 1 tablet by mouth daily   Yes Historical Provider, MD   Omega-3 Fatty Acids (FISH OIL) 1000 MG CAPS Take 3,000 mg by mouth daily   Yes Historical Provider, MD   ibuprofen (ADVIL;MOTRIN) 800 MG tablet TAKE 1 TABLET BY MOUTH EVERY 8 HOURS AS NEEDED FOR PAIN TAKE WITH FOOD. 4/24/17  Yes Jennifer Hernandez MD       Social History:   reports that he has never smoked. He has never used smokeless tobacco. He reports that he does not drink alcohol and does not use drugs. Family History:  family history includes Breast Cancer in his mother; Diabetes in his father and maternal grandmother. Reviewed.  Denies family history of sudden cardiac death, arrhythmia, premature CAD    Review of System:    · General ROS: negative for - chills, fever   · Psychological ROS: negative for - anxiety or depression  · Ophthalmic ROS: negative for - eye pain or loss of vision  · ENT ROS: negative for - epistaxis, headaches, nasal discharge, sore throat   · Allergy and Immunology ROS: negative for - hives, nasal congestion   · Hematological and Lymphatic ROS: negative for - bleeding problems, blood clots, bruising or jaundice  · Endocrine ROS: negative for - skin changes, temperature intolerance or unexpected weight changes  · Respiratory ROS: negative for - cough, hemoptysis, pleuritic pain, SOB, sputum changes or wheezing  · Cardiovascular ROS: Per HPI. · Gastrointestinal ROS: negative for - abdominal pain, blood in stools, diarrhea, hematemesis, melena, nausea/vomiting or swallowing difficulty/pain  · Genito-Urinary ROS: negative for - dysuria or incontinence  · Musculoskeletal ROS: negative for - joint swelling or muscle pain  · Neurological ROS: negative for - confusion, dizziness, gait disturbance, headaches, numbness/tingling, seizures, speech problems, tremors, visual changes or weakness  · Dermatological ROS: negative for - rash    Physical Examination:  Vitals:    06/03/21 1605   BP: 110/80   Pulse: 80       · Constitutional: Oriented. No distress. · Head: Normocephalic and atraumatic. · Mouth/Throat: Oropharynx is clear and moist.   · Eyes: Conjunctivae normal. EOM are normal.   · Neck: Normal range of motion. Neck supple. No rigidity. No JVD present. · Cardiovascular: Normal rate, regular rhythm, S1&S2 and intact distal pulses. · Pulmonary/Chest: Bilateral respiratory sounds. No wheezes. No rhonchi. · Abdominal: Soft. Bowel sounds present. No distension, No tenderness. · Musculoskeletal: No tenderness. No edema    · Lymphadenopathy: Has no cervical adenopathy. · Neurological: Alert and oriented. Cranial nerve appears intact, No Gross deficit   · Skin: Skin is warm and dry. No rash noted. · Psychiatric: Has a normal mood, affect and behavior     Labs:  Reviewed. ECG: reviewed, Sinus  Rhythm (73), with QRS duration 100 ms. No pathologic Q waves, ventricular pre-excitation, or QT prolongation. Studies:   1. 7 day CAM (4/30-5/7/21):  SR with average HR 66 (), 1 run of AT lasting 4 beats, rare PVC (0.12%), PAC (0.08%), no NSVT    2. Echo 4/17/21:   Summary   Left ventricular cavity size is normal. Normal left ventricular wall   thickness. Left ventricular function is normal with ejection fraction   estimated at 55%. Septal bounce noted.  Normal

## 2021-06-03 NOTE — PATIENT INSTRUCTIONS
Side effects of Amiodarone are as follows:    ; Visual disturbances (need to check your eyes once in a year)    ; Blue - gray skin pigmentation and skin photosensitivity; I strongly recommend using sunscreen cream before being exposed to the sun. (at least 10 to 15 SPF)    ;  Thyroid gland dysfunction - over function or under function; need to check the blood levels of thyroid hormones every 6 months    ; Damage to the lungs manifesting as difficulty in breathing - need to check the function of the lungs before even starting the medications.    ; Slowing of the heart rate    ; Damage to the liver - need to check the liver function by a blood test every 6 months    ; Proximal muscle weakness manifesting as difficulty in combing or difficulty in getting out of the chair    ; Neuropathy manifesting as imbalance or tremor    ; Nausea, vomiting and abdominal discomfort

## 2021-06-04 ENCOUNTER — TELEPHONE (OUTPATIENT)
Dept: INTERNAL MEDICINE CLINIC | Age: 56
End: 2021-06-04

## 2021-06-04 DIAGNOSIS — N52.9 ERECTILE DYSFUNCTION, UNSPECIFIED ERECTILE DYSFUNCTION TYPE: ICD-10-CM

## 2021-06-07 RX ORDER — SILDENAFIL 100 MG/1
TABLET, FILM COATED ORAL
Qty: 30 TABLET | Refills: 1 | Status: SHIPPED | OUTPATIENT
Start: 2021-06-07

## 2021-06-25 RX ORDER — AMIODARONE HYDROCHLORIDE 200 MG/1
200 TABLET ORAL DAILY
Qty: 30 TABLET | Refills: 3 | Status: SHIPPED | OUTPATIENT
Start: 2021-06-25 | End: 2021-10-18

## 2021-06-25 NOTE — TELEPHONE ENCOUNTER
Spoke with pt. meds are prescribed by his cardiologist. Pt has been advised to call their office. Pt was also advised to schedule a check up with Dr Alexandria Dumont. He has not been seen for check up since 3/2020. Pt to call back for appt.

## 2021-06-25 NOTE — TELEPHONE ENCOUNTER
Patient is requesting the following prescription(s):  Last appointment: 3/30/2020  Next appointment: Visit date not found  Last refill: 05-      amiodarone (CORDARONE) 200 MG tablet Take 1 tablet by mouth daily   #30    metoprolol succinate (TOPROL XL) 25 MG extended release tablet Take 1 tablet by mouth daily   #30    atorvastatin (LIPITOR) 40 MG tablet Take 1 tablet by mouth nightly   #30    aspirin 81 MG chewable tablet Take 1 tablet by mouth daily   #30    [facility]/pharmacy [address] [phone]     Patient made aware to allow 24 to 48 business hours for prescription refills. Patient can be reached @ phone # provided should there be any questions.

## 2021-06-25 NOTE — TELEPHONE ENCOUNTER
Last appt  6-3-21  Next appt  12-9-21  amiodarone (CORDARONE) 200 MG tablet  Take 1 tablet by mouth daily, Disp-30 tablet, R-1  Normal Last Dose: Not Recorded  Refills:1 ordered     Pharmacy:[facility]/pharmacy [place] [address] [place] [address]

## 2021-07-14 RX ORDER — METOPROLOL SUCCINATE 25 MG/1
25 TABLET, EXTENDED RELEASE ORAL DAILY
Qty: 90 TABLET | Refills: 3 | Status: SHIPPED | OUTPATIENT
Start: 2021-07-14 | End: 2021-12-09

## 2021-07-14 RX ORDER — ATORVASTATIN CALCIUM 40 MG/1
40 TABLET, FILM COATED ORAL NIGHTLY
Qty: 90 TABLET | Refills: 3 | Status: SHIPPED | OUTPATIENT
Start: 2021-07-14 | End: 2022-05-12 | Stop reason: SDUPTHER

## 2021-08-27 ENCOUNTER — HOSPITAL ENCOUNTER (OUTPATIENT)
Dept: PULMONOLOGY | Age: 56
Discharge: HOME | End: 2021-08-27
Payer: COMMERCIAL

## 2021-08-27 DIAGNOSIS — Z79.899 ON AMIODARONE THERAPY: ICD-10-CM

## 2021-08-27 DIAGNOSIS — I47.29 NSVT (NONSUSTAINED VENTRICULAR TACHYCARDIA): ICD-10-CM

## 2021-08-27 LAB
DLCO %PRED: 96 %
DLCO PRED: NORMAL
DLCO/VA %PRED: NORMAL
DLCO/VA PRED: NORMAL
DLCO/VA: NORMAL
DLCO: NORMAL
EXPIRATORY TIME-POST: NORMAL
EXPIRATORY TIME: NORMAL
FEF 25-75% %CHNG: NORMAL
FEF 25-75% %PRED-POST: NORMAL
FEF 25-75% %PRED-PRE: NORMAL
FEF 25-75% PRED: NORMAL
FEF 25-75%-POST: NORMAL
FEF 25-75%-PRE: NORMAL
FEV1 %PRED-POST: 96 %
FEV1 %PRED-PRE: 89 %
FEV1 PRED: NORMAL
FEV1-POST: NORMAL
FEV1-PRE: NORMAL
FEV1/FVC %PRED-POST: NORMAL
FEV1/FVC %PRED-PRE: NORMAL
FEV1/FVC PRED: NORMAL
FEV1/FVC-POST: 81 %
FEV1/FVC-PRE: 77 %
FVC %PRED-POST: NORMAL
FVC %PRED-PRE: NORMAL
FVC PRED: NORMAL
FVC-POST: NORMAL
FVC-PRE: NORMAL
GAW %PRED: NORMAL
GAW PRED: NORMAL
GAW: NORMAL
IC %PRED: NORMAL
IC PRED: NORMAL
IC: NORMAL
MEP: NORMAL
MIP: NORMAL
MVV %PRED-PRE: NORMAL
MVV PRED: NORMAL
MVV-PRE: NORMAL
PEF %PRED-POST: NORMAL
PEF %PRED-PRE: NORMAL
PEF PRED: NORMAL
PEF%CHNG: NORMAL
PEF-POST: NORMAL
PEF-PRE: NORMAL
RAW %PRED: NORMAL
RAW PRED: NORMAL
RAW: NORMAL
RV %PRED: NORMAL
RV PRED: NORMAL
RV: NORMAL
SVC %PRED: NORMAL
SVC PRED: NORMAL
SVC: NORMAL
TLC %PRED: 101 %
TLC PRED: NORMAL
TLC: NORMAL
VA %PRED: NORMAL
VA PRED: NORMAL
VA: NORMAL
VTG %PRED: NORMAL
VTG PRED: NORMAL
VTG: NORMAL

## 2021-08-27 PROCEDURE — 6370000000 HC RX 637 (ALT 250 FOR IP): Performed by: INTERNAL MEDICINE

## 2021-08-27 PROCEDURE — 94760 N-INVAS EAR/PLS OXIMETRY 1: CPT

## 2021-08-27 PROCEDURE — 94060 EVALUATION OF WHEEZING: CPT

## 2021-08-27 PROCEDURE — 94726 PLETHYSMOGRAPHY LUNG VOLUMES: CPT

## 2021-08-27 PROCEDURE — 94729 DIFFUSING CAPACITY: CPT

## 2021-08-27 RX ORDER — ALBUTEROL SULFATE 90 UG/1
4 AEROSOL, METERED RESPIRATORY (INHALATION) ONCE
Status: COMPLETED | OUTPATIENT
Start: 2021-08-27 | End: 2021-08-27

## 2021-08-27 RX ADMIN — Medication 4 PUFF: at 13:44

## 2021-08-27 ASSESSMENT — PULMONARY FUNCTION TESTS
FEV1/FVC_PRE: 77
FEV1/FVC_POST: 81
FEV1_PERCENT_PREDICTED_PRE: 89
FEV1_PERCENT_PREDICTED_POST: 96

## 2021-08-28 NOTE — PROCEDURES
[facility]                               PULMONARY FUNCTION    PATIENT NAME: Nelda Aguilera                  :        1965  MED REC NO:   [id number]                          ROOM:  ACCOUNT NO:   [de-identified]                           ADMIT DATE: 2021  PROVIDER:     Darell Muse MD    DATE OF PROCEDURE:  2021    PFT    Spirometry showed FVC 4.7L, 89% predicted, FEV1 3.6L, 89% predicted,  with FEV1/FVC ratio of 77%. There was no response to bronchodilator. Lung volumes showed air trapping. Diffusion capacity was 96% predicted. IMPRESSION:  Besides air trapping, PFT is normal.  Isolated presence of  air trapping can be seen with bronchial asthma. Clinical correlation is  recommended.         Prabha Valadez MD    D: 2021 16:15:06       T: 2021 22:11:44     [email or web address]  Job#: [id number]     Doc#: [id number]    CC:  MD Dr. Julien Hines

## 2021-08-31 RX ORDER — ASPIRIN 81 MG/1
81 TABLET, CHEWABLE ORAL DAILY
Qty: 30 TABLET | Refills: 3 | Status: SHIPPED | OUTPATIENT
Start: 2021-08-31 | End: 2021-12-22

## 2021-08-31 NOTE — TELEPHONE ENCOUNTER
I Medication Refills:    aspirin 81 MG chewable tablet  Take 1 tablet by mouth daily, Disp-30 tablet, R-3    [facility] [place] [facility] [place] [phone]           Last Office Visit: 06/03/21    Next Office Visit: 12/09/21    Last Refill: 04/21/21    Last Labs: 06/03/21

## 2021-10-19 RX ORDER — AMIODARONE HYDROCHLORIDE 200 MG/1
TABLET ORAL
Qty: 180 TABLET | Refills: 3 | Status: SHIPPED | OUTPATIENT
Start: 2021-10-19 | End: 2022-05-12 | Stop reason: SDUPTHER

## 2021-12-02 NOTE — PROGRESS NOTES
[address]   Cardiac Electrophysiology Consultation   Date: 12/9/2021  Reason for Consultation:  NSVT  Consult Requesting Physician: No att. providers found     Chief Complaint:   Chief Complaint   Patient presents with    6 Month Follow-Up      HPI: Ashlyn Gomez is a 64 y.o. male with no significant PMH. However, he had complaints of palpitations, REGALADO, and LHD, found to have frequent NSVT during the exercise part of stress testing, taken urgently for LHC that showed no significant CAD. EF normal at 55% per echo. He was placed on flecainide for NSVT; however, there was no change and verapamil was added. He continued to have episodes of NSVT and underwent an EP study (4/19/21). RFA was not able to be performed due to high impedance noted during mapping. Patient was taken off flecainide, started on amiodarone, and sent home with a LifeVest.  7 day CAM showed SR with very rare PVC's, but no NSVT. PFTs normal. (8/2021), TSH and LFT normal (6/2021). Interval History: Today, he is here for 6 mo f/u, presenting in [place] [facility] [place]. His only complaint today is fatigue. Denies complaints of palpitations, dizziness, CP, SOB, orthopnea, presyncope, or syncope. He works as a .    Past Medical History:   Diagnosis Date    Anxiety     Chest pain     Depression     PVC (premature ventricular contraction)         Past Surgical History:   Procedure Laterality Date    COLONOSCOPY N/A 2/1/2019    COLONOSCOPY performed by Polly Nj MD at [address] [place] [address]       Allergies:  No Known Allergies    Medication:   Prior to Admission medications    Medication Sig Start Date End Date Taking?  Authorizing Provider   amiodarone (CORDARONE) 200 MG tablet TAKE 1 TABLET BY MOUTH EVERY DAY 10/19/21  Yes KARINA Brody - CNP   aspirin 81 MG chewable tablet Take 1 tablet by mouth daily 8/31/21  Yes KARINA Monroe CNP   atorvastatin (LIPITOR) 40 MG tablet Take 1 tablet by mouth nightly 7/14/21  Yes KARINA Tee - CNP   metoprolol succinate (TOPROL XL) 25 MG extended release tablet Take 1 tablet by mouth daily 7/14/21  Yes KARINA Tee CNP   sildenafil (VIAGRA) 100 MG tablet Take 50mg or 100mg orally at least 30 minutes prior to sexual activity. 6/7/21  Yes Courtney Feliciano MD   cetirizine (ZYRTEC) 10 MG tablet Take 10 mg by mouth daily   Yes Historical Provider, MD   Cholecalciferol (VITAMIN D3) 1000 units TABS Take 1 tablet by mouth daily 9/19/19  Yes Vickie Laura MD   niacin 250 MG extended release capsule Take 250 mg by mouth nightly   Yes Historical Provider, MD   Multiple Vitamins-Minerals (THERAPEUTIC MULTIVITAMIN-MINERALS) tablet Take 1 tablet by mouth daily   Yes Historical Provider, MD   Omega-3 Fatty Acids (FISH OIL) 1000 MG CAPS Take 3,000 mg by mouth daily   Yes Historical Provider, MD       Social History:   reports that he has never smoked. He has never used smokeless tobacco. He reports that he does not drink alcohol and does not use drugs. Family History:  family history includes Breast Cancer in his mother; Diabetes in his father and maternal grandmother. Reviewed. Denies family history of sudden cardiac death, arrhythmia, premature CAD    Review of System:    · General ROS: negative for - chills, fever   · Psychological ROS: negative for - anxiety or depression  · Ophthalmic ROS: negative for - eye pain or loss of vision  · ENT ROS: negative for - epistaxis, headaches, nasal discharge, sore throat   · Allergy and Immunology ROS: negative for - hives, nasal congestion   · Hematological and Lymphatic ROS: negative for - bleeding problems, blood clots, bruising or jaundice  · Endocrine ROS: negative for - skin changes, temperature intolerance or unexpected weight changes  · Respiratory ROS: negative for - cough, hemoptysis, pleuritic pain, SOB, sputum changes or wheezing  · Cardiovascular ROS: Per HPI.    · Gastrointestinal ROS: negative for - abdominal pain, blood in stools, diarrhea, hematemesis, melena, nausea/vomiting or swallowing difficulty/pain  · Genito-Urinary ROS: negative for - dysuria or incontinence  · Musculoskeletal ROS: negative for - joint swelling or muscle pain  · Neurological ROS: negative for - confusion, dizziness, gait disturbance, headaches, numbness/tingling, seizures, speech problems, tremors, visual changes or weakness  · Dermatological ROS: negative for - rash    Physical Examination:  Vitals:    12/09/21 1533   BP: 110/60   Pulse: 70       · Constitutional: Oriented. No distress. · Head: Normocephalic and atraumatic. · Mouth/Throat: Oropharynx is clear and moist.   · Eyes: Conjunctivae normal. EOM are normal.   · Neck: Normal range of motion. Neck supple. No rigidity. No JVD present. · Cardiovascular: Normal rate, regular rhythm, S1&S2 and intact distal pulses. · Pulmonary/Chest: Bilateral respiratory sounds. No wheezes. No rhonchi. · Abdominal: Soft. Bowel sounds present. No distension, No tenderness. · Musculoskeletal: No tenderness. No edema    · Lymphadenopathy: Has no cervical adenopathy. · Neurological: Alert and oriented. Cranial nerve appears intact, No Gross deficit   · Skin: Skin is warm and dry. No rash noted. · Psychiatric: Has a normal mood, affect and behavior     Labs:  Reviewed. ECG: reviewed, Sinus  Rhythm 63, with QRS duration 100 ms. No pathologic Q waves, ventricular pre-excitation, or QT prolongation. Studies:   1. 7 day CAM (4/30-5/7/21):  SR with average HR 66 (), 1 run of AT lasting 4 beats, rare PVC (0.12%), PAC (0.08%), no NSVT    2. Echo 4/17/21:   Summary   Left ventricular cavity size is normal. Normal left ventricular wall   thickness. Left ventricular function is normal with ejection fraction   estimated at 55%. Septal bounce noted. Normal diastolic function. Estimated   pulmonary artery systolic pressure is at 27 mmHg assuming a right atrial   pressure of 3 mmHg.     3. Stress Test:      4. Cath 4/16/21:   Conclusions:  -No obstructive CAD  -Normal LVEF    The MCOT, echocardiogram, stress test, and coronary angiography/PCI were reviewed by myself and used for my plan of care. Procedures:  1.  Echo, 2 week CAM, labs in 6 months    Assessment/Plan:   1. NSVT, on amiodarone and Toprol  2. PVCs  3. Anxiety  4. HLD, on statin     He was admitted to the hospital secondary to symptomatic ventricular tachycardia. Initially, he was initiated on flecainide which was not helpful. Hence, he was taken to the lab for EP study and possible ablation. However, during EP study we noted that multiple trabeculations within the right ventricle which was a trigger for ventricular tachycardia. Whenever the catheter was placed over there, impedance was increasing and hence, not able to deliver any effective energy. Hence, ablation was not attempted. He was initiated on amiodarone which suppressed his ventricular tach arrhythmias. His cardiac MRI was reported to be within normal limits without any evidence of ARVD. On amiodarone 200 mg daily    Due to fatigue, decrease Toprol to 12.5 mg daily    Will also plan for echo, 2 week CAM, and amiodarone labs (TSH, LFT) prior to next OV in 6 months    Follow up in 6 months with me. Thank you for allowing me to participate in the care of Blessing Rios. All questions and concerns were addressed to the patient/family. Alternatives to my treatment were discussed. Emerald De La Fuente RN, am scribing for and in the presence of Dr. Katina Trent. 12/09/21 3:50 PM  Senait Jaime RN    I, Hilaria Young MD, personally performed the services prescribed in this documentation as scribed by Ms. Senait Jaime RN in my presence and it is both accurate and complete.      Hilaria Young MD  Cardiac Electrophysiology  [facility]

## 2021-12-09 ENCOUNTER — OFFICE VISIT (OUTPATIENT)
Dept: CARDIOLOGY CLINIC | Age: 56
End: 2021-12-09
Payer: COMMERCIAL

## 2021-12-09 VITALS
WEIGHT: 218.4 LBS | SYSTOLIC BLOOD PRESSURE: 110 MMHG | BODY MASS INDEX: 29.58 KG/M2 | DIASTOLIC BLOOD PRESSURE: 60 MMHG | HEART RATE: 70 BPM | HEIGHT: 72 IN

## 2021-12-09 DIAGNOSIS — F41.9 ANXIETY DISORDER, UNSPECIFIED TYPE: ICD-10-CM

## 2021-12-09 DIAGNOSIS — I49.3 PVC'S (PREMATURE VENTRICULAR CONTRACTIONS): ICD-10-CM

## 2021-12-09 DIAGNOSIS — Z79.899 ON AMIODARONE THERAPY: ICD-10-CM

## 2021-12-09 DIAGNOSIS — I47.29 NSVT (NONSUSTAINED VENTRICULAR TACHYCARDIA): Primary | ICD-10-CM

## 2021-12-09 PROCEDURE — 99214 OFFICE O/P EST MOD 30 MIN: CPT | Performed by: INTERNAL MEDICINE

## 2021-12-09 PROCEDURE — 93000 ELECTROCARDIOGRAM COMPLETE: CPT | Performed by: INTERNAL MEDICINE

## 2021-12-09 RX ORDER — METOPROLOL SUCCINATE 25 MG/1
12.5 TABLET, EXTENDED RELEASE ORAL DAILY
Qty: 45 TABLET | Refills: 3 | Status: SHIPPED | OUTPATIENT
Start: 2021-12-09 | End: 2022-05-12 | Stop reason: SDUPTHER

## 2022-04-11 ENCOUNTER — NURSE ONLY (OUTPATIENT)
Dept: CARDIOLOGY CLINIC | Age: 57
End: 2022-04-11

## 2022-04-11 PROCEDURE — 93248 EXT ECG>7D<15D REV&INTERPJ: CPT | Performed by: INTERNAL MEDICINE

## 2022-04-29 DIAGNOSIS — I47.29 NSVT (NONSUSTAINED VENTRICULAR TACHYCARDIA): ICD-10-CM

## 2022-04-29 PROCEDURE — 93246 EXT ECG>7D<15D RECORDING: CPT | Performed by: INTERNAL MEDICINE

## 2022-05-02 ENCOUNTER — TELEPHONE (OUTPATIENT)
Dept: CARDIOLOGY CLINIC | Age: 57
End: 2022-05-02

## 2022-05-02 NOTE — TELEPHONE ENCOUNTER
LVM asking pt to call back to discuss monitor results. Prior to OV with UL on 6/16/22, pt needs an echo and amio screening labs.

## 2022-05-03 NOTE — TELEPHONE ENCOUNTER
Reviewed results of monitor with pt. Asked that he call and get an echo scheduled at [facility] (closer to his home) and get labs done prior to visit with UL on 6/16. Pt verbalized understanding.

## 2022-05-12 RX ORDER — METOPROLOL SUCCINATE 25 MG/1
12.5 TABLET, EXTENDED RELEASE ORAL DAILY
Qty: 45 TABLET | Refills: 3 | Status: SHIPPED | OUTPATIENT
Start: 2022-05-12 | End: 2022-06-07 | Stop reason: SDUPTHER

## 2022-05-12 RX ORDER — ATORVASTATIN CALCIUM 40 MG/1
40 TABLET, FILM COATED ORAL NIGHTLY
Qty: 90 TABLET | Refills: 3 | Status: SHIPPED | OUTPATIENT
Start: 2022-05-12 | End: 2022-06-07 | Stop reason: SDUPTHER

## 2022-05-12 RX ORDER — AMIODARONE HYDROCHLORIDE 200 MG/1
TABLET ORAL
Qty: 90 TABLET | Refills: 3 | Status: SHIPPED | OUTPATIENT
Start: 2022-05-12 | End: 2022-06-07 | Stop reason: SDUPTHER

## 2022-05-12 RX ORDER — ASPIRIN 81 MG/1
TABLET, CHEWABLE ORAL
Qty: 90 TABLET | Refills: 3 | Status: SHIPPED | OUTPATIENT
Start: 2022-05-12 | End: 2022-06-07 | Stop reason: SDUPTHER

## 2022-05-12 NOTE — TELEPHONE ENCOUNTER
Requested Prescriptions     Pending Prescriptions Disp Refills    metoprolol succinate (TOPROL XL) 25 MG extended release tablet 45 tablet 3     Sig: Take 0.5 tablets by mouth daily    aspirin ([employer] ASPIRIN ADULT LOW DOSE) 81 MG chewable tablet 90 tablet 3     Sig: TAKE 1 TABLET BY MOUTH EVERY DAY    atorvastatin (LIPITOR) 40 MG tablet 90 tablet 3     Sig: Take 1 tablet by mouth nightly    amiodarone (CORDARONE) 200 MG tablet 90 tablet 3     Sig: TAKE 1 TABLET BY MOUTH EVERY DAY                  Last Office Visit: 12/09/2021    Next Office Visit: 6/16/2022         Last Labs06/03/2021

## 2022-06-06 ENCOUNTER — TELEPHONE (OUTPATIENT)
Dept: CARDIOLOGY CLINIC | Age: 57
End: 2022-06-06

## 2022-06-06 NOTE — TELEPHONE ENCOUNTER
1) Pt received a bill from Filemon Espinal for date of service of 4/28/22. Is this bill for 2- week CAM placed 4/11/22? Kurt Every was for 350.00. He is going to check with his insurance company. 2) His pharmacy has changed to [address] He needs refills to go there.      amiodarone (CORDARONE) 200 MG tablet  TAKE 1 TABLET BY MOUTH EVERY DAY, Disp-90 tablet, R-3    aspirin ([employer]S ASPIRIN ADULT LOW DOSE) 81 MG chewable tablet  TAKE 1 TABLET BY MOUTH EVERY DAY, Disp-90 tablet, R-3    atorvastatin (LIPITOR) 40 MG tablet  Take 1 tablet by mouth nightly, Disp-90 tablet, R-3    metoprolol succinate (TOPROL XL) 25 MG extended release tablet  Take 0.5 tablets by mouth daily, Disp-45 tablet, R-3    Last visit: 12/9/21  Next visit: 6/16/22

## 2022-06-07 DIAGNOSIS — I47.29 NSVT (NONSUSTAINED VENTRICULAR TACHYCARDIA): Primary | ICD-10-CM

## 2022-06-07 RX ORDER — AMIODARONE HYDROCHLORIDE 200 MG/1
TABLET ORAL
Qty: 90 TABLET | Refills: 3 | Status: SHIPPED | OUTPATIENT
Start: 2022-06-07 | End: 2022-06-16 | Stop reason: SDUPTHER

## 2022-06-07 RX ORDER — ASPIRIN 81 MG/1
TABLET, CHEWABLE ORAL
Qty: 90 TABLET | Refills: 3 | Status: SHIPPED | OUTPATIENT
Start: 2022-06-07 | End: 2022-09-26 | Stop reason: SDUPTHER

## 2022-06-07 RX ORDER — METOPROLOL SUCCINATE 25 MG/1
12.5 TABLET, EXTENDED RELEASE ORAL DAILY
Qty: 45 TABLET | Refills: 3 | Status: SHIPPED | OUTPATIENT
Start: 2022-06-07 | End: 2022-06-16 | Stop reason: SDUPTHER

## 2022-06-07 RX ORDER — ATORVASTATIN CALCIUM 40 MG/1
40 TABLET, FILM COATED ORAL NIGHTLY
Qty: 90 TABLET | Refills: 3 | Status: SHIPPED | OUTPATIENT
Start: 2022-06-07 | End: 2022-06-16 | Stop reason: SDUPTHER

## 2022-06-08 NOTE — TELEPHONE ENCOUNTER
ANAM for pt letting him know that he needs labs drawn and his new perscriptions has been sent to the pharmacy.

## 2022-06-09 NOTE — PROGRESS NOTES
[facility]   Cardiac Electrophysiology Consultation   Date: 6/16/2022  Reason for Consultation:  NSVT  Consult Requesting Physician: No att. providers found     Chief Complaint:   Chief Complaint   Patient presents with    6 Month Follow-Up      HPI: Ashlyn Gomez is a 64 y.o. male with no significant PMH. However, he had complaints of palpitations, REGALADO, and LHD, found to have frequent NSVT during the exercise part of stress testing, taken urgently for [facility] that showed no significant CAD. EF normal at 55%, started on flecainide for NSVT; however, there was no change and verapamil was added. S/p EP study (4/19/21), but  RFA was not able to be performed due to high impedance noted during mapping. Patient was taken off flecainide, started on amiodarone, and sent home with a LifeVest.  7 day CAM (4/2021) very rare PVC's, but no NSVT. Repeat monitor (4/2022) showed 8.5% unifocal PVC's, but no NSVT. Repeat echo (6/2022) showed EF 65%. PFTs normal. (8/2021), TSH and LFT normal (6/2021). Interval History: Today, he is here for 6 mo f/u, presenting in [employer] [facility] [place]. His only complaint today is fatigue. Denies complaints of palpitations, dizziness, CP, SOB, orthopnea, presyncope, or syncope. He works as a .    Assessment:  1. NSVT, normal coronaries, on Toprol and amiodarone  2. PVC's, unifocal, outflow tract  3. Anxiety  4. HLD, on statin    Plan:  1. 2 weeks CAM  2. Continue his current medications; Cut down Amio to 100 mg daily  3. F/u with EP NP in 6 months    Past Medical History:   Diagnosis Date    Anxiety     Chest pain     Depression     PVC (premature ventricular contraction)         Past Surgical History:   Procedure Laterality Date    COLONOSCOPY N/A 2/1/2019    COLONOSCOPY performed by Polly Nj MD at [address] [place] [address]       Allergies:  No Known Allergies    Medication:   Prior to Admission medications    Medication Sig Start Date End Date Taking?  Authorizing Provider amiodarone (PACERONE) 100 MG tablet TAKE 1 TABLET BY MOUTH EVERY DAY 6/16/22  Yes Jacinta Valdez MD   atorvastatin (LIPITOR) 40 MG tablet Take 1 tablet by mouth nightly 6/16/22  Yes Jacinta Valdez MD   metoprolol succinate (TOPROL XL) 25 MG extended release tablet Take 0.5 tablets by mouth daily 6/16/22  Yes Jacinta Valdez MD   aspirin ([employer]S ASPIRIN ADULT LOW DOSE) 81 MG chewable tablet TAKE 1 TABLET BY MOUTH EVERY DAY 6/7/22  Yes KARINA Younger - CNP   sildenafil (VIAGRA) 100 MG tablet Take 50mg or 100mg orally at least 30 minutes prior to sexual activity. 6/7/21  Yes Korey Hernandez MD   cetirizine (ZYRTEC) 10 MG tablet Take 10 mg by mouth daily   Yes Historical Provider, MD   Cholecalciferol (VITAMIN D3) 1000 units TABS Take 1 tablet by mouth daily 9/19/19  Yes Asher Lowry MD   niacin 250 MG extended release capsule Take 250 mg by mouth nightly   Yes Historical Provider, MD   Multiple Vitamins-Minerals (THERAPEUTIC MULTIVITAMIN-MINERALS) tablet Take 1 tablet by mouth daily   Yes Historical Provider, MD   Omega-3 Fatty Acids (FISH OIL) 1000 MG CAPS Take 3,000 mg by mouth daily   Yes Historical Provider, MD       Social History:   reports that he has never smoked. He has never used smokeless tobacco. He reports that he does not drink alcohol and does not use drugs. Family History:  family history includes Breast Cancer in his mother; Diabetes in his father and maternal grandmother. Reviewed.  Denies family history of sudden cardiac death, arrhythmia, premature CAD    Review of System:    · General ROS: negative for - chills, fever   · Psychological ROS: negative for - anxiety or depression  · Ophthalmic ROS: negative for - eye pain or loss of vision  · ENT ROS: negative for - epistaxis, headaches, nasal discharge, sore throat   · Allergy and Immunology ROS: negative for - hives, nasal congestion   · Hematological and Lymphatic ROS: negative for - bleeding problems, blood clots, bruising or jaundice  · Endocrine ROS: negative for - skin changes, temperature intolerance or unexpected weight changes  · Respiratory ROS: negative for - cough, hemoptysis, pleuritic pain, SOB, sputum changes or wheezing  · Cardiovascular ROS: Per HPI. · Gastrointestinal ROS: negative for - abdominal pain, blood in stools, diarrhea, hematemesis, melena, nausea/vomiting or swallowing difficulty/pain  · Genito-Urinary ROS: negative for - dysuria or incontinence  · Musculoskeletal ROS: negative for - joint swelling or muscle pain  · Neurological ROS: negative for - confusion, dizziness, gait disturbance, headaches, numbness/tingling, seizures, speech problems, tremors, visual changes or weakness  · Dermatological ROS: negative for - rash    Physical Examination:  Vitals:    06/16/22 1541   BP: 115/70   Pulse: 61       · Constitutional: Oriented. No distress. · Head: Normocephalic and atraumatic. · Mouth/Throat: Oropharynx is clear and moist.   · Eyes: Conjunctivae normal. EOM are normal.   · Neck: Normal range of motion. Neck supple. No rigidity. No JVD present. · Cardiovascular: Normal rate, regular rhythm, S1&S2 and intact distal pulses. · Pulmonary/Chest: Bilateral respiratory sounds. No wheezes. No rhonchi. · Abdominal: Soft. Bowel sounds present. No distension, No tenderness. · Musculoskeletal: No tenderness. No edema    · Lymphadenopathy: Has no cervical adenopathy. · Neurological: Alert and oriented. Cranial nerve appears intact, No Gross deficit   · Skin: Skin is warm and dry. No rash noted. · Psychiatric: Has a normal mood, affect and behavior     Labs:  Reviewed. ECG: reviewed, Sinus  Rhythm 63, with QRS duration 100 ms. No pathologic Q waves, ventricular pre-excitation, or QT prolongation.      Studies:   1. 7 day CAM (4/30-5/7/21):  SR with average HR 66 (), 1 run of AT lasting 4 beats, rare PVC (0.12%), PAC (0.08%), no NSVT    2. Echo 4/17/21: Summary   Left ventricular cavity size is normal. Normal left ventricular wall   thickness. Left ventricular function is normal with ejection fraction   estimated at 55%. Septal bounce noted. Normal diastolic function. Estimated   pulmonary artery systolic pressure is at 27 mmHg assuming a right atrial   pressure of 3 mmHg. 3. Cath 4/16/21:   Conclusions:  -No obstructive CAD  -Normal LVEF    The MCOT, echocardiogram, stress test, and coronary angiography/PCI were reviewed by myself and used for my plan of care. - The patient is counseled to follow a low salt diet to assure blood pressure remains controlled for cardiovascular risk factor modification.   - The patient is counseled to avoid excess caffeine, and energy drinks as this may exacerbated ectopy and arrhythmia. - The patient is counseled to get regular exercise 3-5 times per week to control cardiovascular risk factors. - The patient is counseled to lose weigt to control cardiovascular risk factors. -The patient is counseled about the health hazards of smoking including cardiovascular side effects and its impact on morbidity and mortality.     Thank you for allowing me to participate in the care of [address]. All questions and concerns were addressed to the patient/family. Alternatives to my treatment were discussed. Glenda Norton RN, am scribing for and in the presence of Dr. Kiah King. 06/16/22 4:12 PM  Asher Covarrubias RN    I, Clari Cardenas MD, personally performed the services prescribed in this documentation as scribed by Ms. Asher Covarrubias RN in my presence and it is both accurate and complete.        Clari Cardenas MD  Cardiac Electrophysiology  [facility]

## 2022-06-10 ENCOUNTER — HOSPITAL ENCOUNTER (OUTPATIENT)
Dept: NON INVASIVE DIAGNOSTICS | Age: 57
Discharge: HOME | End: 2022-06-10
Payer: COMMERCIAL

## 2022-06-10 ENCOUNTER — TELEPHONE (OUTPATIENT)
Dept: CARDIOLOGY CLINIC | Age: 57
End: 2022-06-10

## 2022-06-10 ENCOUNTER — HOSPITAL ENCOUNTER (OUTPATIENT)
Age: 57
Discharge: HOME | End: 2022-06-10
Payer: COMMERCIAL

## 2022-06-10 DIAGNOSIS — I49.3 PVC'S (PREMATURE VENTRICULAR CONTRACTIONS): ICD-10-CM

## 2022-06-10 DIAGNOSIS — I47.29 NSVT (NONSUSTAINED VENTRICULAR TACHYCARDIA): ICD-10-CM

## 2022-06-10 LAB
ANION GAP SERPL CALCULATED.3IONS-SCNC: 13 MMOL/L (ref 3–16)
BUN BLDV-MCNC: 19 MG/DL (ref 7–20)
CALCIUM SERPL-MCNC: 9.4 MG/DL (ref 8.3–10.6)
CHLORIDE BLD-SCNC: 109 MMOL/L (ref 99–110)
CHOLESTEROL, TOTAL: 127 MG/DL (ref 0–199)
CO2: 21 MMOL/L (ref 21–32)
CREAT SERPL-MCNC: 1 MG/DL (ref 0.9–1.3)
GFR AFRICAN AMERICAN: >60
GFR NON-AFRICAN AMERICAN: >60
GLUCOSE BLD-MCNC: 89 MG/DL (ref 70–99)
HCT VFR BLD CALC: 41.6 % (ref 40.5–52.5)
HDLC SERPL-MCNC: 44 MG/DL (ref 40–60)
HEMOGLOBIN: 14.3 G/DL (ref 13.5–17.5)
LDL CHOLESTEROL CALCULATED: 75 MG/DL
LV EF: 65 %
LVEF MODALITY: NORMAL
MCH RBC QN AUTO: 32 PG (ref 26–34)
MCHC RBC AUTO-ENTMCNC: 34.2 G/DL (ref 31–36)
MCV RBC AUTO: 93.3 FL (ref 80–100)
PDW BLD-RTO: 14.1 % (ref 12.4–15.4)
PLATELET # BLD: 232 K/UL (ref 135–450)
PMV BLD AUTO: 9.7 FL (ref 5–10.5)
POTASSIUM SERPL-SCNC: 4.6 MMOL/L (ref 3.5–5.1)
RBC # BLD: 4.46 M/UL (ref 4.2–5.9)
SODIUM BLD-SCNC: 143 MMOL/L (ref 136–145)
TRIGL SERPL-MCNC: 39 MG/DL (ref 0–150)
VLDLC SERPL CALC-MCNC: 8 MG/DL
WBC # BLD: 5.1 K/UL (ref 4–11)

## 2022-06-10 PROCEDURE — 80048 BASIC METABOLIC PNL TOTAL CA: CPT

## 2022-06-10 PROCEDURE — 93306 TTE W/DOPPLER COMPLETE: CPT

## 2022-06-10 PROCEDURE — 36415 COLL VENOUS BLD VENIPUNCTURE: CPT

## 2022-06-10 PROCEDURE — 85027 COMPLETE CBC AUTOMATED: CPT

## 2022-06-10 PROCEDURE — 80061 LIPID PANEL: CPT

## 2022-06-16 ENCOUNTER — OFFICE VISIT (OUTPATIENT)
Dept: CARDIOLOGY CLINIC | Age: 57
End: 2022-06-16
Payer: COMMERCIAL

## 2022-06-16 VITALS
BODY MASS INDEX: 29.02 KG/M2 | WEIGHT: 214 LBS | DIASTOLIC BLOOD PRESSURE: 70 MMHG | HEART RATE: 61 BPM | SYSTOLIC BLOOD PRESSURE: 115 MMHG

## 2022-06-16 DIAGNOSIS — I47.29 NSVT (NONSUSTAINED VENTRICULAR TACHYCARDIA): Primary | ICD-10-CM

## 2022-06-16 DIAGNOSIS — I49.3 PVC'S (PREMATURE VENTRICULAR CONTRACTIONS): ICD-10-CM

## 2022-06-16 DIAGNOSIS — Z79.899 ON AMIODARONE THERAPY: ICD-10-CM

## 2022-06-16 PROCEDURE — 93000 ELECTROCARDIOGRAM COMPLETE: CPT | Performed by: INTERNAL MEDICINE

## 2022-06-16 PROCEDURE — 99214 OFFICE O/P EST MOD 30 MIN: CPT | Performed by: INTERNAL MEDICINE

## 2022-06-16 RX ORDER — METOPROLOL SUCCINATE 25 MG/1
12.5 TABLET, EXTENDED RELEASE ORAL DAILY
Qty: 90 TABLET | Refills: 3 | Status: SHIPPED | OUTPATIENT
Start: 2022-06-16

## 2022-06-16 RX ORDER — ATORVASTATIN CALCIUM 40 MG/1
40 TABLET, FILM COATED ORAL NIGHTLY
Qty: 90 TABLET | Refills: 3 | Status: SHIPPED | OUTPATIENT
Start: 2022-06-16

## 2022-06-16 RX ORDER — AMIODARONE HYDROCHLORIDE 100 MG/1
TABLET ORAL
Qty: 90 TABLET | Refills: 3 | Status: SHIPPED | OUTPATIENT
Start: 2022-06-16 | End: 2022-06-17

## 2022-06-17 ENCOUNTER — TELEPHONE (OUTPATIENT)
Dept: CARDIOLOGY CLINIC | Age: 57
End: 2022-06-17

## 2022-06-17 RX ORDER — AMIODARONE HYDROCHLORIDE 200 MG/1
TABLET ORAL
Qty: 180 TABLET | Refills: 5 | Status: SHIPPED | OUTPATIENT
Start: 2022-06-17

## 2022-06-17 NOTE — TELEPHONE ENCOUNTER
Spoke with patient, reordered 200 mg of amio- patient knows to cut in half. Verbalized understanding.

## 2022-06-17 NOTE — TELEPHONE ENCOUNTER
Pt meds were changed from Amiodarone 200mg to 100mg     200 mg cost $9   100mg is $362    He would like 200mg he can cut n half.  Please advise

## 2022-06-21 ENCOUNTER — TELEPHONE (OUTPATIENT)
Dept: CARDIOLOGY CLINIC | Age: 57
End: 2022-06-21

## 2022-06-28 PROCEDURE — 93244 EXT ECG>48HR<7D REV&INTERPJ: CPT | Performed by: INTERNAL MEDICINE

## 2022-06-28 PROCEDURE — 93242 EXT ECG>48HR<7D RECORDING: CPT | Performed by: INTERNAL MEDICINE

## 2022-07-01 DIAGNOSIS — I47.29 NSVT (NONSUSTAINED VENTRICULAR TACHYCARDIA): ICD-10-CM

## 2022-07-07 ENCOUNTER — TELEPHONE (OUTPATIENT)
Dept: CARDIOLOGY CLINIC | Age: 57
End: 2022-07-07

## 2022-09-26 RX ORDER — ASPIRIN 81 MG/1
TABLET, CHEWABLE ORAL
Qty: 90 TABLET | Refills: 3 | Status: SHIPPED | OUTPATIENT
Start: 2022-09-26 | End: 2022-10-05 | Stop reason: SDUPTHER

## 2022-09-26 NOTE — TELEPHONE ENCOUNTER
Requested Prescriptions     Pending Prescriptions Disp Refills    aspirin ([employer]S ASPIRIN ADULT LOW DOSE) 81 MG chewable tablet 90 tablet 3     Sig: TAKE 1 TABLET BY MOUTH EVERY DAY              Last Office Visit: 6/16/2022     Next Office Visit: 12/29/2022

## 2022-10-05 RX ORDER — ASPIRIN 81 MG/1
TABLET, CHEWABLE ORAL
Qty: 90 TABLET | Refills: 3 | Status: SHIPPED | OUTPATIENT
Start: 2022-10-05

## 2022-12-22 NOTE — TELEPHONE ENCOUNTER
49 hour CAM placed 6/16/2022 for VT ##[id number] [facility]  [address]  [address]  [phone]    Work/School Note    Date: 12/22/2022    To Whom It May concern:    Garrett Cordero was seen and treated today in the emergency room by the following provider(s):  Attending Provider: Nura Snatizo MD.      Garrett Cordero is excused from work/school on 12/22/22 and 12/23/22. She is medically clear to return to work/school on 12/24/2022.        Sincerely,          Abdulaziz Monsivais MD

## 2022-12-23 PROBLEM — I49.3 PVC (PREMATURE VENTRICULAR CONTRACTION): Status: ACTIVE | Noted: 2022-12-23

## 2022-12-23 NOTE — PROGRESS NOTES
[address]   Cardiac Electrophysiology   Chief Complaint:   Chief Complaint   Patient presents with    Follow-up     6 Month / 2 day cam         HPI: María Elena Aguillon is a 62 y.o. male with no significant PMH. However, he had complaints of palpitations, REGALADO, and LHD, found to have frequent NSVT during the exercise part of stress testing, taken urgently for LHC that showed no significant CAD. EF normal at 55%, started on flecainide for NSVT; however, there was no change and verapamil was added     S/p EP study (4/19/21), but  RFA was not able to be performed due to high impedance noted during mapping. Patient was taken off flecainide, and started on amiodarone. 7 day CAM (4/2021) very rare PVC's, but no NSVT. Repeat monitor (4/2022) showed 8.5% unifocal PVC's, but no NSVT. Repeat echo (6/2022) showed EF 65%. PFTs normal. (8/2021), TSH and LFT normal (6/2021). CAM report 06/16/2022 to 06/18/2022 -predominant NSR PAC 0.01%, PVC 0.69%, no pauses > 2.5 seconds. Interval History: Today, he is here for 6 mo f/u. He states he is doing well. Patient denies lightheadedness, dizziness, chest pain, palpitations, orthopnea, edema, presyncope or syncope. He has recently taken up hiking and is able to hike several miles without difficulty. Assessment:  1. NSVT, normal coronaries, on Toprol and amiodarone   - none on recent monitor   2. PVC's, unifocal, outflow tract   0.69% on recent CAM    No-obstructive CAD 04/2021   3. Anxiety  4.  HLD, on statin    Plan:  Repeat CMP, hepatic, TSH   Follow up in six months     Past Medical History:   Diagnosis Date    Anxiety     Chest pain     Depression     PVC (premature ventricular contraction)         Past Surgical History:   Procedure Laterality Date    COLONOSCOPY N/A 2/1/2019    COLONOSCOPY performed by Adriana Young MD at [address] [place] [address]       Allergies:  No Known Allergies    Medication:   Prior to Admission medications    Medication Sig Start Date End Date Taking? Authorizing Provider   aspirin ([facility] ASPIRIN ADULT LOW DOSE) 81 MG chewable tablet TAKE 1 TABLET BY MOUTH EVERY DAY 10/5/22  Yes KARINA Romero - CNP   amiodarone (CORDARONE) 200 MG tablet TAKE 1 TABLET BY MOUTH EVERY DAY 6/17/22  Yes Manpreet Hoyos MD   atorvastatin (LIPITOR) 40 MG tablet Take 1 tablet by mouth nightly 6/16/22  Yes Manpreet Hoyos MD   metoprolol succinate (TOPROL XL) 25 MG extended release tablet Take 0.5 tablets by mouth daily 6/16/22  Yes Manpreet Hoyos MD   sildenafil (VIAGRA) 100 MG tablet Take 50mg or 100mg orally at least 30 minutes prior to sexual activity. 6/7/21  Yes Franci Booker MD   cetirizine (ZYRTEC) 10 MG tablet Take 10 mg by mouth daily   Yes Historical Provider, MD   Cholecalciferol (VITAMIN D3) 1000 units TABS Take 1 tablet by mouth daily 9/19/19  Yes Nereida Fair MD   niacin 250 MG extended release capsule Take 250 mg by mouth nightly   Yes Historical Provider, MD   Multiple Vitamins-Minerals (THERAPEUTIC MULTIVITAMIN-MINERALS) tablet Take 1 tablet by mouth daily   Yes Historical Provider, MD   Omega-3 Fatty Acids (FISH OIL) 1000 MG CAPS Take 3,000 mg by mouth daily   Yes Historical Provider, MD       Social History:   reports that he has never smoked. He has never used smokeless tobacco. He reports that he does not drink alcohol and does not use drugs. Family History:  family history includes Breast Cancer in his mother; Diabetes in his father and maternal grandmother. Reviewed.  Denies family history of sudden cardiac death, arrhythmia, premature CAD    Review of System:    General ROS: negative for - chills, fever   Psychological ROS: negative for - anxiety or depression  Ophthalmic ROS: negative for - eye pain or loss of vision  ENT ROS: negative for - epistaxis, headaches, nasal discharge, sore throat   Allergy and Immunology ROS: negative for - hives, nasal congestion   Hematological and Lymphatic ROS: negative for - bleeding problems, blood clots, bruising or jaundice  Endocrine ROS: negative for - skin changes, temperature intolerance or unexpected weight changes  Respiratory ROS: negative for - cough, hemoptysis, pleuritic pain, SOB, sputum changes or wheezing  Cardiovascular ROS: Per HPI. Gastrointestinal ROS: negative for - abdominal pain, blood in stools, diarrhea, hematemesis, melena, nausea/vomiting or swallowing difficulty/pain  Genito-Urinary ROS: negative for - dysuria or incontinence  Musculoskeletal ROS: negative for - joint swelling or muscle pain  Neurological ROS: negative for - confusion, dizziness, gait disturbance, headaches, numbness/tingling, seizures, speech problems, tremors, visual changes or weakness  Dermatological ROS: negative for - rash    Physical Examination:  Vitals:    12/29/22 1416   BP: 110/70   Pulse: 72         Constitutional: Oriented. No distress. Head: Normocephalic and atraumatic. Mouth/Throat: Oropharynx is clear and moist.   Eyes: Conjunctivae normal. EOM are normal.   Neck: Normal range of motion. Neck supple. No rigidity. No JVD present. Cardiovascular: Normal rate, regular rhythm, S1&S2 and intact distal pulses. Pulmonary/Chest: Bilateral respiratory sounds. No wheezes. No rhonchi. Abdominal: Soft. Bowel sounds present. No distension, No tenderness. Musculoskeletal: No tenderness. No edema    Lymphadenopathy: Has no cervical adenopathy. Neurological: Alert and oriented. Cranial nerve appears intact, No Gross deficit   Skin: Skin is warm and dry. No rash noted. Psychiatric: Has a normal mood, affect and behavior     Labs:  Reviewed. ECG: reviewed, Sinus  Rhythm 63, with QRS duration 100 ms. No pathologic Q waves, ventricular pre-excitation, or QT prolongation. Studies:   1. CAM 06/16/2022 to 06/18/2022 -predominant NSR PAC 0.01%, PVC 0.69%, no pauses > 2.5 seconds.     7 day CAM (4/30-5/7/21):  SR with average HR 66 (), 1 run of AT lasting 4 beats, rare PVC (0.12%), PAC (0.08%), no NSVT    2. Echo 4/17/21:   Summary   Left ventricular cavity size is normal. Normal left ventricular wall   thickness. Left ventricular function is normal with ejection fraction   estimated at 55%. Septal bounce noted. Normal diastolic function. Estimated   pulmonary artery systolic pressure is at 27 mmHg assuming a right atrial   pressure of 3 mmHg. 3. Cath 4/16/21:   Conclusions:  -No obstructive CAD  -Normal LVEF    The MCOT, echocardiogram, stress test, and coronary angiography/PCI were reviewed by myself and used for my plan of care. - The patient is counseled to follow a low salt diet to assure blood pressure remains controlled for cardiovascular risk factor modification.   - The patient is counseled to avoid excess caffeine, and energy drinks as this may exacerbated ectopy and arrhythmia. - The patient is counseled to get regular exercise 3-5 times per week to control cardiovascular risk factors. - The patient is counseled to lose weigt to control cardiovascular risk factors. -The patient is counseled about the health hazards of smoking including cardiovascular side effects and its impact on morbidity and mortality. Thank you for allowing me to participate in the care of Brandin Montes. All questions and concerns were addressed to the patient/family. Alternatives to my treatment were discussed. Siena Snider RN, am scribing for and in the presence of Dr. Terry Romero. 12/29/22 2:22 PM  Luis Patino RN     I, Kat Baker MD, personally performed the services prescribed in this documentation as scribed in my presence and it is both accurate and complete.    Kat Baker MD  Cardiac Electrophysiology  [facility]

## 2022-12-29 ENCOUNTER — OFFICE VISIT (OUTPATIENT)
Dept: CARDIOLOGY CLINIC | Age: 57
End: 2022-12-29
Payer: COMMERCIAL

## 2022-12-29 VITALS
BODY MASS INDEX: 28.62 KG/M2 | DIASTOLIC BLOOD PRESSURE: 70 MMHG | SYSTOLIC BLOOD PRESSURE: 110 MMHG | HEART RATE: 72 BPM | WEIGHT: 211 LBS

## 2022-12-29 DIAGNOSIS — I47.29 NSVT (NONSUSTAINED VENTRICULAR TACHYCARDIA): Primary | ICD-10-CM

## 2022-12-29 DIAGNOSIS — I49.3 PVC (PREMATURE VENTRICULAR CONTRACTION): ICD-10-CM

## 2022-12-29 DIAGNOSIS — Z79.899 ON AMIODARONE THERAPY: ICD-10-CM

## 2022-12-29 LAB
A/G RATIO: 2.4 (ref 1.1–2.2)
ALBUMIN SERPL-MCNC: 4.6 G/DL (ref 3.4–5)
ALP BLD-CCNC: 51 U/L (ref 40–129)
ALT SERPL-CCNC: 20 U/L (ref 10–40)
ANION GAP SERPL CALCULATED.3IONS-SCNC: 9 MMOL/L (ref 3–16)
AST SERPL-CCNC: 19 U/L (ref 15–37)
BILIRUB SERPL-MCNC: 0.4 MG/DL (ref 0–1)
BILIRUBIN DIRECT: <0.2 MG/DL (ref 0–0.3)
BILIRUBIN, INDIRECT: NORMAL MG/DL (ref 0–1)
BUN BLDV-MCNC: 18 MG/DL (ref 7–20)
CALCIUM SERPL-MCNC: 9.3 MG/DL (ref 8.3–10.6)
CHLORIDE BLD-SCNC: 105 MMOL/L (ref 99–110)
CO2: 25 MMOL/L (ref 21–32)
CREAT SERPL-MCNC: 1 MG/DL (ref 0.9–1.3)
GFR SERPL CREATININE-BSD FRML MDRD: >60 ML/MIN/{1.73_M2}
GLUCOSE BLD-MCNC: 97 MG/DL (ref 70–99)
POTASSIUM SERPL-SCNC: 4.4 MMOL/L (ref 3.5–5.1)
SODIUM BLD-SCNC: 139 MMOL/L (ref 136–145)
TOTAL PROTEIN: 6.5 G/DL (ref 6.4–8.2)
TSH REFLEX: 2.07 UIU/ML (ref 0.27–4.2)

## 2022-12-29 PROCEDURE — 99214 OFFICE O/P EST MOD 30 MIN: CPT | Performed by: INTERNAL MEDICINE

## 2022-12-29 PROCEDURE — 93000 ELECTROCARDIOGRAM COMPLETE: CPT | Performed by: INTERNAL MEDICINE

## 2023-06-26 RX ORDER — METOPROLOL SUCCINATE 25 MG/1
TABLET, EXTENDED RELEASE ORAL
Qty: 45 TABLET | Refills: 3 | Status: SHIPPED | OUTPATIENT
Start: 2023-06-26

## 2023-06-26 RX ORDER — ATORVASTATIN CALCIUM 40 MG/1
40 TABLET, FILM COATED ORAL NIGHTLY
Qty: 90 TABLET | Refills: 3 | Status: SHIPPED | OUTPATIENT
Start: 2023-06-26

## 2023-06-26 RX ORDER — AMIODARONE HYDROCHLORIDE 200 MG/1
TABLET ORAL
Qty: 90 TABLET | Refills: 3 | Status: SHIPPED | OUTPATIENT
Start: 2023-06-26

## 2023-07-25 ENCOUNTER — OFFICE VISIT (OUTPATIENT)
Dept: CARDIOLOGY CLINIC | Age: 58
End: 2023-07-25
Payer: COMMERCIAL

## 2023-07-25 VITALS
SYSTOLIC BLOOD PRESSURE: 110 MMHG | WEIGHT: 214 LBS | BODY MASS INDEX: 29.02 KG/M2 | DIASTOLIC BLOOD PRESSURE: 62 MMHG | HEART RATE: 70 BPM

## 2023-07-25 DIAGNOSIS — I47.29 NSVT (NONSUSTAINED VENTRICULAR TACHYCARDIA) (HCC): ICD-10-CM

## 2023-07-25 DIAGNOSIS — I49.3 PVC (PREMATURE VENTRICULAR CONTRACTION): ICD-10-CM

## 2023-07-25 DIAGNOSIS — Z79.899 ON AMIODARONE THERAPY: Primary | ICD-10-CM

## 2023-07-25 PROCEDURE — 99214 OFFICE O/P EST MOD 30 MIN: CPT | Performed by: INTERNAL MEDICINE

## 2023-07-25 PROCEDURE — 93000 ELECTROCARDIOGRAM COMPLETE: CPT | Performed by: INTERNAL MEDICINE

## 2023-10-11 RX ORDER — ASPIRIN 81 MG/1
TABLET, CHEWABLE ORAL
Qty: 90 TABLET | Refills: 3 | Status: SHIPPED | OUTPATIENT
Start: 2023-10-11

## 2023-10-11 NOTE — TELEPHONE ENCOUNTER
Requested Prescriptions     Pending Prescriptions Disp Refills    aspirin ([employer]S ASPIRIN ADULT LOW DOSE) 81 MG chewable tablet 90 tablet 3     Sig: TAKE 1 TABLET BY MOUTH EVERY DAY            Last Office Visit: 7/25/2023     Next Office Visit: 2/8/2024

## 2023-10-23 ASSESSMENT — PATIENT HEALTH QUESTIONNAIRE - PHQ9
4. FEELING TIRED OR HAVING LITTLE ENERGY: 0
6. FEELING BAD ABOUT YOURSELF - OR THAT YOU ARE A FAILURE OR HAVE LET YOURSELF OR YOUR FAMILY DOWN: NOT AT ALL
8. MOVING OR SPEAKING SO SLOWLY THAT OTHER PEOPLE COULD HAVE NOTICED. OR THE OPPOSITE - BEING SO FIDGETY OR RESTLESS THAT YOU HAVE BEEN MOVING AROUND A LOT MORE THAN USUAL: NOT AT ALL
10. IF YOU CHECKED OFF ANY PROBLEMS, HOW DIFFICULT HAVE THESE PROBLEMS MADE IT FOR YOU TO DO YOUR WORK, TAKE CARE OF THINGS AT HOME, OR GET ALONG WITH OTHER PEOPLE: 0
3. TROUBLE FALLING OR STAYING ASLEEP: NOT AT ALL
SUM OF ALL RESPONSES TO PHQ QUESTIONS 1-9: 0
SUM OF ALL RESPONSES TO PHQ QUESTIONS 1-9: 0
1. LITTLE INTEREST OR PLEASURE IN DOING THINGS: NOT AT ALL
5. POOR APPETITE OR OVEREATING: NOT AT ALL
7. TROUBLE CONCENTRATING ON THINGS, SUCH AS READING THE NEWSPAPER OR WATCHING TELEVISION: NOT AT ALL
5. POOR APPETITE OR OVEREATING: 0
3. TROUBLE FALLING OR STAYING ASLEEP: 0
2. FEELING DOWN, DEPRESSED OR HOPELESS: NOT AT ALL
8. MOVING OR SPEAKING SO SLOWLY THAT OTHER PEOPLE COULD HAVE NOTICED. OR THE OPPOSITE, BEING SO FIGETY OR RESTLESS THAT YOU HAVE BEEN MOVING AROUND A LOT MORE THAN USUAL: 0
9. THOUGHTS THAT YOU WOULD BE BETTER OFF DEAD, OR OF HURTING YOURSELF: 0
SUM OF ALL RESPONSES TO PHQ QUESTIONS 1-9: 0
4. FEELING TIRED OR HAVING LITTLE ENERGY: NOT AT ALL
2. FEELING DOWN, DEPRESSED OR HOPELESS: 0
1. LITTLE INTEREST OR PLEASURE IN DOING THINGS: 0
6. FEELING BAD ABOUT YOURSELF - OR THAT YOU ARE A FAILURE OR HAVE LET YOURSELF OR YOUR FAMILY DOWN: 0
7. TROUBLE CONCENTRATING ON THINGS, SUCH AS READING THE NEWSPAPER OR WATCHING TELEVISION: 0
SUM OF ALL RESPONSES TO PHQ QUESTIONS 1-9: 0
SUM OF ALL RESPONSES TO PHQ QUESTIONS 1-9: 0
SUM OF ALL RESPONSES TO PHQ9 QUESTIONS 1 & 2: 0
10. IF YOU CHECKED OFF ANY PROBLEMS, HOW DIFFICULT HAVE THESE PROBLEMS MADE IT FOR YOU TO DO YOUR WORK, TAKE CARE OF THINGS AT HOME, OR GET ALONG WITH OTHER PEOPLE: NOT DIFFICULT AT ALL
9. THOUGHTS THAT YOU WOULD BE BETTER OFF DEAD, OR OF HURTING YOURSELF: NOT AT ALL

## 2023-10-26 DIAGNOSIS — N52.9 ERECTILE DYSFUNCTION, UNSPECIFIED ERECTILE DYSFUNCTION TYPE: ICD-10-CM

## 2023-10-26 RX ORDER — SILDENAFIL 100 MG/1
TABLET, FILM COATED ORAL
Qty: 30 TABLET | Refills: 1 | Status: SHIPPED | OUTPATIENT
Start: 2023-10-26

## 2023-10-26 NOTE — TELEPHONE ENCOUNTER
Last appointment: 3/22/21  Next appointment: 10/30/2023  Last refill: 6/7/21  Please advise patient hasn't been seen in 2 years but has a upcoming appt.

## 2023-10-27 SDOH — ECONOMIC STABILITY: INCOME INSECURITY: HOW HARD IS IT FOR YOU TO PAY FOR THE VERY BASICS LIKE FOOD, HOUSING, MEDICAL CARE, AND HEATING?: SOMEWHAT HARD

## 2023-10-27 SDOH — ECONOMIC STABILITY: HOUSING INSECURITY
IN THE LAST 12 MONTHS, WAS THERE A TIME WHEN YOU DID NOT HAVE A STEADY PLACE TO SLEEP OR SLEPT IN A SHELTER (INCLUDING NOW)?: NO

## 2023-10-27 SDOH — ECONOMIC STABILITY: FOOD INSECURITY: WITHIN THE PAST 12 MONTHS, THE FOOD YOU BOUGHT JUST DIDN'T LAST AND YOU DIDN'T HAVE MONEY TO GET MORE.: NEVER TRUE

## 2023-10-27 SDOH — ECONOMIC STABILITY: FOOD INSECURITY: WITHIN THE PAST 12 MONTHS, YOU WORRIED THAT YOUR FOOD WOULD RUN OUT BEFORE YOU GOT MONEY TO BUY MORE.: NEVER TRUE

## 2023-10-27 SDOH — ECONOMIC STABILITY: TRANSPORTATION INSECURITY
IN THE PAST 12 MONTHS, HAS LACK OF TRANSPORTATION KEPT YOU FROM MEETINGS, WORK, OR FROM GETTING THINGS NEEDED FOR DAILY LIVING?: NO

## 2023-10-30 ENCOUNTER — OFFICE VISIT (OUTPATIENT)
Dept: INTERNAL MEDICINE CLINIC | Age: 58
End: 2023-10-30
Payer: COMMERCIAL

## 2023-10-30 VITALS
HEIGHT: 72 IN | HEART RATE: 66 BPM | WEIGHT: 217.4 LBS | BODY MASS INDEX: 29.45 KG/M2 | SYSTOLIC BLOOD PRESSURE: 118 MMHG | TEMPERATURE: 97.2 F | DIASTOLIC BLOOD PRESSURE: 68 MMHG | OXYGEN SATURATION: 97 %

## 2023-10-30 DIAGNOSIS — F32.5 MAJOR DEPRESSIVE DISORDER WITH SINGLE EPISODE, IN FULL REMISSION (HCC): ICD-10-CM

## 2023-10-30 DIAGNOSIS — Z13.220 ENCOUNTER FOR LIPID SCREENING FOR CARDIOVASCULAR DISEASE: ICD-10-CM

## 2023-10-30 DIAGNOSIS — I47.29 NSVT (NONSUSTAINED VENTRICULAR TACHYCARDIA) (HCC): Primary | ICD-10-CM

## 2023-10-30 DIAGNOSIS — Z13.6 ENCOUNTER FOR LIPID SCREENING FOR CARDIOVASCULAR DISEASE: ICD-10-CM

## 2023-10-30 DIAGNOSIS — Z23 NEED FOR VACCINATION: ICD-10-CM

## 2023-10-30 PROBLEM — F33.42 RECURRENT MAJOR DEPRESSIVE DISORDER, IN FULL REMISSION (HCC): Status: RESOLVED | Noted: 2017-01-17 | Resolved: 2023-10-30

## 2023-10-30 PROBLEM — F33.42 RECURRENT MAJOR DEPRESSIVE DISORDER, IN FULL REMISSION (HCC): Status: ACTIVE | Noted: 2017-01-17

## 2023-10-30 PROCEDURE — 90715 TDAP VACCINE 7 YRS/> IM: CPT | Performed by: INTERNAL MEDICINE

## 2023-10-30 PROCEDURE — 90471 IMMUNIZATION ADMIN: CPT | Performed by: INTERNAL MEDICINE

## 2023-10-30 PROCEDURE — 99214 OFFICE O/P EST MOD 30 MIN: CPT | Performed by: INTERNAL MEDICINE

## 2023-10-30 NOTE — PROGRESS NOTES
Jaycee Jackson (:  1965) is a 62 y.o. male,Established patient, here for evaluation of the following chief complaint(s):  Follow-up (F/U chronic conditions)      ASSESSMENT/PLAN:  1. NSVT (nonsustained ventricular tachycardia) ([address])  Assessment & Plan: On amiodarone 100mg Qod. Follows with Dr. Alfie Montero. Has had LHC. On ASA/statin, toprol XL 25mg daily. Orders:  -     CBC with Auto Differential; Future  -     Comprehensive Metabolic Panel; Future  -     TSH with Reflex; Future  2. Encounter for lipid screening for cardiovascular disease  -     Lipid Panel; Future  3. Need for vaccination  -     Tdap, BOOSTRIX, (age 8 yrs+), IM  4. Major depressive disorder with single episode, in full remission ([address])  Comments: In remission. Not a current issue      Return for next available procedure for skin tag removal.    SUBJECTIVE/OBJECTIVE:  HPI    Overall he is feeling well  No palpitations. Working on getting down and off of amiodarone. Down to 100mg qod. Review of Systems    Current Outpatient Medications on File Prior to Visit   Medication Sig Dispense Refill    sildenafil (VIAGRA) 100 MG tablet Take 50mg or 100mg orally at least 30 minutes prior to sexual activity.  30 tablet 1    aspirin ([employer] ASPIRIN ADULT LOW DOSE) 81 MG chewable tablet TAKE 1 TABLET BY MOUTH EVERY DAY 90 tablet 3    atorvastatin (LIPITOR) 40 MG tablet TAKE 1 TABLET BY MOUTH NIGHTLY 90 tablet 3    metoprolol succinate (TOPROL XL) 25 MG extended release tablet TAKE 1/2 TABLET BY MOUTH EVERY DAY 45 tablet 3    amiodarone (CORDARONE) 200 MG tablet TAKE 1 TABLET BY MOUTH EVERY DAY 90 tablet 3    Cholecalciferol (VITAMIN D3) 1000 units TABS Take 1 tablet by mouth daily 90 tablet 0    Multiple Vitamins-Minerals (THERAPEUTIC MULTIVITAMIN-MINERALS) tablet Take 1 tablet by mouth daily      Omega-3 Fatty Acids (FISH OIL) 1000 MG CAPS Take 3 capsules by mouth daily      cetirizine (ZYRTEC) 10 MG tablet Take 1 tablet by mouth daily       No

## 2023-10-30 NOTE — ASSESSMENT & PLAN NOTE
On amiodarone 100mg Qod. Follows with Dr. Phillip Leal. Has had LHC. On ASA/statin, toprol XL 25mg daily.

## 2023-11-25 DIAGNOSIS — Z13.6 ENCOUNTER FOR LIPID SCREENING FOR CARDIOVASCULAR DISEASE: ICD-10-CM

## 2023-11-25 DIAGNOSIS — I47.29 NSVT (NONSUSTAINED VENTRICULAR TACHYCARDIA) (HCC): ICD-10-CM

## 2023-11-25 DIAGNOSIS — Z13.220 ENCOUNTER FOR LIPID SCREENING FOR CARDIOVASCULAR DISEASE: ICD-10-CM

## 2023-11-25 LAB
ALBUMIN SERPL-MCNC: 4.5 G/DL (ref 3.4–5)
ALBUMIN/GLOB SERPL: 2 {RATIO} (ref 1.1–2.2)
ALP SERPL-CCNC: 60 U/L (ref 40–129)
ALT SERPL-CCNC: 18 U/L (ref 10–40)
ANION GAP SERPL CALCULATED.3IONS-SCNC: 9 MMOL/L (ref 3–16)
AST SERPL-CCNC: 19 U/L (ref 15–37)
BASOPHILS # BLD: 0 K/UL (ref 0–0.2)
BASOPHILS NFR BLD: 0.5 %
BILIRUB SERPL-MCNC: 0.5 MG/DL (ref 0–1)
BUN SERPL-MCNC: 21 MG/DL (ref 7–20)
CALCIUM SERPL-MCNC: 9.1 MG/DL (ref 8.3–10.6)
CHLORIDE SERPL-SCNC: 106 MMOL/L (ref 99–110)
CHOLEST SERPL-MCNC: 128 MG/DL (ref 0–199)
CO2 SERPL-SCNC: 24 MMOL/L (ref 21–32)
CREAT SERPL-MCNC: 1.1 MG/DL (ref 0.9–1.3)
DEPRECATED RDW RBC AUTO: 13.6 % (ref 12.4–15.4)
EOSINOPHIL # BLD: 0.1 K/UL (ref 0–0.6)
EOSINOPHIL NFR BLD: 2.6 %
GFR SERPLBLD CREATININE-BSD FMLA CKD-EPI: >60 ML/MIN/{1.73_M2}
GLUCOSE SERPL-MCNC: 93 MG/DL (ref 70–99)
HCT VFR BLD AUTO: 43.7 % (ref 40.5–52.5)
HDLC SERPL-MCNC: 43 MG/DL (ref 40–60)
HGB BLD-MCNC: 14.9 G/DL (ref 13.5–17.5)
LDLC SERPL CALC-MCNC: 73 MG/DL
LYMPHOCYTES # BLD: 1.4 K/UL (ref 1–5.1)
LYMPHOCYTES NFR BLD: 26.7 %
MCH RBC QN AUTO: 31.6 PG (ref 26–34)
MCHC RBC AUTO-ENTMCNC: 34 G/DL (ref 31–36)
MCV RBC AUTO: 93 FL (ref 80–100)
MONOCYTES # BLD: 0.6 K/UL (ref 0–1.3)
MONOCYTES NFR BLD: 12.4 %
NEUTROPHILS # BLD: 3 K/UL (ref 1.7–7.7)
NEUTROPHILS NFR BLD: 57.8 %
PLATELET # BLD AUTO: 236 K/UL (ref 135–450)
PMV BLD AUTO: 9.1 FL (ref 5–10.5)
POTASSIUM SERPL-SCNC: 4.2 MMOL/L (ref 3.5–5.1)
PROT SERPL-MCNC: 6.7 G/DL (ref 6.4–8.2)
RBC # BLD AUTO: 4.7 M/UL (ref 4.2–5.9)
SODIUM SERPL-SCNC: 139 MMOL/L (ref 136–145)
T4 FREE SERPL-MCNC: 2 NG/DL (ref 0.9–1.8)
TRIGL SERPL-MCNC: 61 MG/DL (ref 0–150)
TSH SERPL DL<=0.005 MIU/L-ACNC: 0.09 UIU/ML (ref 0.27–4.2)
VLDLC SERPL CALC-MCNC: 12 MG/DL
WBC # BLD AUTO: 5.1 K/UL (ref 4–11)

## 2023-11-27 ENCOUNTER — TELEPHONE (OUTPATIENT)
Dept: INTERNAL MEDICINE CLINIC | Age: 58
End: 2023-11-27

## 2023-11-27 DIAGNOSIS — Z79.899 LONG TERM CURRENT USE OF AMIODARONE: Primary | ICD-10-CM

## 2023-11-27 DIAGNOSIS — Z79.899 LONG TERM CURRENT USE OF AMIODARONE: ICD-10-CM

## 2023-11-27 DIAGNOSIS — E05.90 HYPERTHYROIDISM: ICD-10-CM

## 2023-11-27 NOTE — TELEPHONE ENCOUNTER
Garima with [facility] is calling to  let Marylou Ok know they are not able to do the 2 add-ons  that were faxed  over for thyroid testing because wrong tube was used. Patient will  need to be re-drawn in order to have these add-ons done.

## 2023-11-28 NOTE — TELEPHONE ENCOUNTER
Left pt VM asking to come back to have additional labs drawn or go to any [facility] lab to have done at earliest convenience.  Advised to call or message with any questions

## 2023-12-02 DIAGNOSIS — E05.90 HYPERTHYROIDISM: ICD-10-CM

## 2023-12-02 DIAGNOSIS — Z79.899 LONG TERM CURRENT USE OF AMIODARONE: ICD-10-CM

## 2023-12-04 LAB — TSI SER-ACNC: <0.1 IU/L

## 2023-12-05 DIAGNOSIS — E05.90 HYPERTHYROIDISM: Primary | ICD-10-CM

## 2023-12-05 DIAGNOSIS — Z79.899 LONG TERM CURRENT USE OF AMIODARONE: ICD-10-CM

## 2023-12-05 LAB — TSH RECEP AB SER-ACNC: <1.1 IU/L

## 2024-01-10 ENCOUNTER — OFFICE VISIT (OUTPATIENT)
Dept: ENDOCRINOLOGY | Age: 59
End: 2024-01-10
Payer: COMMERCIAL

## 2024-01-10 VITALS
HEART RATE: 80 BPM | HEIGHT: 72 IN | SYSTOLIC BLOOD PRESSURE: 103 MMHG | BODY MASS INDEX: 28.82 KG/M2 | RESPIRATION RATE: 16 BRPM | DIASTOLIC BLOOD PRESSURE: 74 MMHG | WEIGHT: 212.8 LBS

## 2024-01-10 DIAGNOSIS — E05.90 HYPERTHYROIDISM: Primary | ICD-10-CM

## 2024-01-10 DIAGNOSIS — E05.90 HYPERTHYROIDISM: ICD-10-CM

## 2024-01-10 DIAGNOSIS — I47.29 NSVT (NONSUSTAINED VENTRICULAR TACHYCARDIA) (HCC): ICD-10-CM

## 2024-01-10 LAB
T4 FREE SERPL-MCNC: 2.5 NG/DL (ref 0.9–1.8)
TSH SERPL DL<=0.005 MIU/L-ACNC: <0.01 UIU/ML (ref 0.27–4.2)

## 2024-01-10 PROCEDURE — 99204 OFFICE O/P NEW MOD 45 MIN: CPT | Performed by: INTERNAL MEDICINE

## 2024-01-10 NOTE — PROGRESS NOTES
[facility] Endocrinology        Chief Complaint   Patient presents with    New Patient     Hyperthyroid       Mario Calderon is a pleasant 58 y.o. year old male here to establish care for thyrotoxicosis. Patient has history of premature ventricular contractions, major depressive disorder, anxiety and has a BMI of 28.     Review of records shows that patient had normal TSH between 2017 through December 2022.  Labs in November 2023 showed a TSH of 0.09.  Accompanied with that was a free T4 of 2.0.  TSI and thyrotropin receptor antibody were both undetectable.      Patient was previously found to have frequent NSVT during exercise or stress testing.  He was taken urgently for cardiac cath which showed no significant coronary artery disease.  EF was 55%.  He was subsequently started on flecainide.  At some point flecainide was switched to amiodarone.  Patient had been on amiodarone since around May 2021.  He was previously on 200 mg daily and dose was adjusted in July 2023 by his cardiologist to 100 mg every other day.  He reports that he dislikes taking medications and would like to see if he can come down further on amiodarone in the future.    Patient denies any worsening of palpitations around the time of last TSH check in November.  He has occasional tremors.  No recent changes in weight, bowel movements or anxiety.  No recent upper respiratory tract infections.  Does not take any biotin.  No family history of thyroid disease.    He does not smoke and drinks 2 drinks 2 days a week.  He is sexually active with female.  He lives alone and works as a  at .  He has 2 healthy daughters.    ALLERGIES:  No Known Allergies     MEDICATIONS:  Outpatient Medications Marked as Taking for the 1/10/24 encounter (Office Visit) with Deysi Zhu MD   Medication Sig Dispense Refill    sildenafil (VIAGRA) 100 MG tablet Take 50mg or 100mg orally at least 30 minutes prior to sexual activity. 30 tablet 1    atorvastatin

## 2024-01-11 DIAGNOSIS — E05.90 HYPERTHYROIDISM: Primary | ICD-10-CM

## 2024-01-12 ENCOUNTER — TELEPHONE (OUTPATIENT)
Dept: ENDOCRINOLOGY | Age: 59
End: 2024-01-12

## 2024-01-12 NOTE — TELEPHONE ENCOUNTER
Patient was informed of test results. Number for central scheduling was given. Patient had no questions.

## 2024-01-15 ENCOUNTER — TELEPHONE (OUTPATIENT)
Dept: CARDIOLOGY CLINIC | Age: 59
End: 2024-01-15

## 2024-01-15 ENCOUNTER — TELEPHONE (OUTPATIENT)
Dept: ENDOCRINOLOGY | Age: 59
End: 2024-01-15

## 2024-01-15 NOTE — TELEPHONE ENCOUNTER
Patient has a PFT and bronchodilatation scheduled for 1/29 and is asking if his hyperthyroidism will interfere with either test.

## 2024-01-17 ENCOUNTER — TELEPHONE (OUTPATIENT)
Dept: ENDOCRINOLOGY | Age: 59
End: 2024-01-17

## 2024-01-17 NOTE — TELEPHONE ENCOUNTER
Pt calling and states his cardiologist has stopped his Amiodarone and he wanted to call us and let his know. Already another encounter from his cardio Dr in his chart regarding this matter    CB#[phone]

## 2024-01-18 NOTE — TELEPHONE ENCOUNTER
Stopping amiodarone will likely help thyroid function improve but this might take time.  Would recommend to continue with same plan and update labs before next visit in April.  Please update patient.

## 2024-01-19 ENCOUNTER — PATIENT MESSAGE (OUTPATIENT)
Dept: ENDOCRINOLOGY | Age: 59
End: 2024-01-19

## 2024-02-01 NOTE — PROGRESS NOTES
[facility]   Cardiac Electrophysiology   Chief Complaint:   Chief Complaint   Patient presents with    Tachycardia      HPI: Mario Calderon is a 58 y.o. male with no significant PMH who p/w palpitations, REGALADO, and LHD, found to have frequent NSVT during the exercise of stress test, taken urgently for LHC that showed no significant CAD, EF 55%, started on flecainide for NSVT; however, there was no change and verapamil was added. S/p EPS (4/19/21), but RFA was not able to be performed due to high impedance noted during mapping, switched from flecainide to amiodarone. 7 day CAM (4/2021) with rare PVC's and no NSVT.  Repeat monitor (4/2022) showed 8.5% unifocal PVC's, but no NSVT. Echo (6/2022) showed EF 65%. 2 day CAM (6/2022) showed <1% PVC and no NSVT.  PFTs normal (8/2021), LFT normal (11/2023), found to have hyperthyroidism, amiodarone stopped (1/2024), following endocrinology.    Interval History:   Today, he is here for 6 mo f/u.  Last month, he had elevated thyroxine levels.  Hence, his amiodarone was completely stopped.  Since then, he denies to have any palpitations or shortness of breath.    Assessment:  1. NSVT, normal coronaries, on Toprol  2. PVC's, unifocal, outflow tract, low burden on recent monitor,   3. Nonobstructive CAD, on ASA, Toprol   4. Anxiety  5. HLD, on statin  6. Hyperthyroidism, following Dr. Zhu    Plan:  Discussed about the side effects of amiodarone.  It may take about 6 to 12 weeks for amiodarone to be completely out of the system.  Repeat TFT and repeat of 3 months.  Plan to repeat cardiac monitor for 1 week, and second week of April to evaluate any recurrence of arrhythmia.  Follow-up with me in 6 months    Past Medical History:   Diagnosis Date    Anxiety     Chest pain     Depression     PVC (premature ventricular contraction)         Past Surgical History:   Procedure Laterality Date    COLONOSCOPY N/A 2/1/2019    COLONOSCOPY performed by Leroy Ford MD at [facility]

## 2024-02-08 ENCOUNTER — OFFICE VISIT (OUTPATIENT)
Dept: CARDIOLOGY CLINIC | Age: 59
End: 2024-02-08
Payer: COMMERCIAL

## 2024-02-08 VITALS
BODY MASS INDEX: 29.35 KG/M2 | SYSTOLIC BLOOD PRESSURE: 118 MMHG | WEIGHT: 216.4 LBS | DIASTOLIC BLOOD PRESSURE: 62 MMHG | HEART RATE: 82 BPM

## 2024-02-08 DIAGNOSIS — I47.29 NSVT (NONSUSTAINED VENTRICULAR TACHYCARDIA) (HCC): Primary | ICD-10-CM

## 2024-02-08 DIAGNOSIS — Z79.899 ON AMIODARONE THERAPY: ICD-10-CM

## 2024-02-08 DIAGNOSIS — I49.3 PVC (PREMATURE VENTRICULAR CONTRACTION): ICD-10-CM

## 2024-02-08 DIAGNOSIS — E78.49 OTHER HYPERLIPIDEMIA: ICD-10-CM

## 2024-02-08 DIAGNOSIS — E05.90 HYPERTHYROIDISM: ICD-10-CM

## 2024-02-08 PROCEDURE — 99214 OFFICE O/P EST MOD 30 MIN: CPT | Performed by: INTERNAL MEDICINE

## 2024-02-08 PROCEDURE — 93000 ELECTROCARDIOGRAM COMPLETE: CPT | Performed by: INTERNAL MEDICINE

## 2024-02-08 RX ORDER — ASPIRIN 81 MG
81 TABLET,CHEWABLE ORAL DAILY
COMMUNITY
Start: 2024-01-13

## 2024-03-06 ENCOUNTER — TELEPHONE (OUTPATIENT)
Dept: INTERNAL MEDICINE CLINIC | Age: 59
End: 2024-03-06

## 2024-03-06 DIAGNOSIS — L60.0 INGROWN TOENAIL: ICD-10-CM

## 2024-03-06 DIAGNOSIS — L98.9 SKIN LESION OF UPPER EXTREMITY: Primary | ICD-10-CM

## 2024-03-06 NOTE — TELEPHONE ENCOUNTER
----- Message from Jessica Gerardo sent at 3/6/2024  2:15 PM EST -----  Subject: Message to Provider    QUESTIONS  Information for Provider? PT WANTS A SKIN TAG REMOVED FROM RIGHT UPPER ARM AND NEEDS A SHINGLES VACCINE NEEDS 2ND   ---------------------------------------------------------------------------  --------------  CALL BACK INFO  [phone]; OK to leave message on voicemail  ---------------------------------------------------------------------------  --------------  SCRIPT ANSWERS  Relationship to Patient? Self  (Is the patient requesting to see the provider for a procedure?)? Yes

## 2024-03-07 NOTE — TELEPHONE ENCOUNTER
[facility] appt scheduled.    Patient states he also has an ingrown toenail. Please provide referral for podiatry. Info for both referrals can be sent to patient through [facility]

## 2024-03-08 DIAGNOSIS — N52.9 ERECTILE DYSFUNCTION, UNSPECIFIED ERECTILE DYSFUNCTION TYPE: ICD-10-CM

## 2024-03-11 RX ORDER — SILDENAFIL 100 MG/1
TABLET, FILM COATED ORAL
Qty: 30 TABLET | Refills: 2 | Status: SHIPPED | OUTPATIENT
Start: 2024-03-11

## 2024-03-15 ENCOUNTER — NURSE ONLY (OUTPATIENT)
Dept: INTERNAL MEDICINE CLINIC | Age: 59
End: 2024-03-15
Payer: COMMERCIAL

## 2024-03-15 DIAGNOSIS — Z23 NEED FOR VACCINATION: Primary | ICD-10-CM

## 2024-03-15 PROCEDURE — 90750 HZV VACC RECOMBINANT IM: CPT | Performed by: INTERNAL MEDICINE

## 2024-03-15 PROCEDURE — 90471 IMMUNIZATION ADMIN: CPT | Performed by: INTERNAL MEDICINE

## 2024-04-15 ENCOUNTER — NURSE ONLY (OUTPATIENT)
Dept: CARDIOLOGY CLINIC | Age: 59
End: 2024-04-15

## 2024-04-15 DIAGNOSIS — E05.90 HYPERTHYROIDISM: ICD-10-CM

## 2024-04-16 LAB
T4 FREE SERPL-MCNC: 1.3 NG/DL (ref 0.9–1.8)
TSH SERPL DL<=0.005 MIU/L-ACNC: 3.28 UIU/ML (ref 0.27–4.2)

## 2024-04-17 ENCOUNTER — OFFICE VISIT (OUTPATIENT)
Dept: ENDOCRINOLOGY | Age: 59
End: 2024-04-17
Payer: COMMERCIAL

## 2024-04-17 VITALS
SYSTOLIC BLOOD PRESSURE: 118 MMHG | BODY MASS INDEX: 29.12 KG/M2 | HEART RATE: 77 BPM | DIASTOLIC BLOOD PRESSURE: 65 MMHG | HEIGHT: 72 IN | WEIGHT: 215 LBS

## 2024-04-17 DIAGNOSIS — E05.90 HYPERTHYROIDISM: Primary | ICD-10-CM

## 2024-04-17 DIAGNOSIS — I49.3 PVC (PREMATURE VENTRICULAR CONTRACTION): ICD-10-CM

## 2024-04-17 PROCEDURE — 99213 OFFICE O/P EST LOW 20 MIN: CPT | Performed by: INTERNAL MEDICINE

## 2024-04-17 NOTE — PROGRESS NOTES
4.20 uIU/mL Final   12/29/2022 2.07 0.27 - 4.20 uIU/mL Final     T4 Free   Date Value Ref Range Status   04/15/2024 1.3 0.9 - 1.8 ng/dL Final   01/10/2024 2.5 (H) 0.9 - 1.8 ng/dL Final     TSI   Date Value Ref Range Status   12/02/2023 <0.10 <=0.54 IU/L Final     Comment:     INTERPRETIVE INFORMATION: Thyroid Stimulating Immunoglobulin                            (TSI)  0.54 IU/L or less.........Consistent with healthy thyroid function or  non-Graves thyroid or autoimmune disease. Those with healthy thyroid  function  typically have results less than 0.1 IU/L.  0.55 IU/L or greater......Consistent with Graves disease (autoimmune  hyperthyroidism)  This assay specifically detects thyroid stimulating autoantibodies. For  diagnostic purposes, the results obtained from this assay should be  used in  combination with clinical examination, patient medical history, and  other  findings.  Performed By: [facility]  [address] [place] [address]  : Luca Leal MD, PhD  CLIA Number: [id number]       Tsh Receptor Ab   Date Value Ref Range Status   12/02/2023 <1.10 <=1.75 IU/L Final     Comment:     Performed By: [facility]  [address] [place] [address]  : Luca Leal MD, PhD  CLIA Number: [id number]        ASSESSMENT/PLAN:  1. 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  Noted incidentally on labs from November 2023 with suppressed TSH and elevated free T4.  Abnormalities persisted in January 2024.  Labs did normalize 3 months after stopping amiodarone which is suggestive of amiodarone induced thyrotoxicosis.  No therapy is indicated at this time but would recommend to repeat labs in 6 months.  Reassurance provided to patient.  If amiodarone is used in the future, frequent monitoring of TSH and free T4 would be indicated and therapy might be required.  This appears to be type II thyrotoxicosis given duration and negative

## 2024-04-18 ENCOUNTER — TELEPHONE (OUTPATIENT)
Dept: CARDIOLOGY CLINIC | Age: 59
End: 2024-04-18

## 2024-05-03 DIAGNOSIS — R00.2 PALPITATION: Primary | ICD-10-CM

## 2024-05-08 ENCOUNTER — TELEPHONE (OUTPATIENT)
Dept: CARDIOLOGY CLINIC | Age: 59
End: 2024-05-08

## 2024-05-08 RX ORDER — FLECAINIDE ACETATE 100 MG/1
100 TABLET ORAL 2 TIMES DAILY
Qty: 60 TABLET | Refills: 5 | Status: SHIPPED | OUTPATIENT
Start: 2024-05-08

## 2024-05-08 NOTE — TELEPHONE ENCOUNTER
Spoke with pt regarding monitor results and UL's recommendation. Pt verbalized understanding of starting flecainide 100 mg BID, scheduled ECG for 5/17.    Patient will be started on new medication flecainide.  Patient verbalizes understanding of the need for treatment and education provided at today's visit. [facility]

## 2024-05-08 NOTE — TELEPHONE ENCOUNTER
LVM asking pt to call back to discuss results of monitor.    6 day monitor showed > 10 K runs of NSVT and 14% PVCs. Per UL, start flecainide 100 mg BID. Will need 7-10 day ECG after starting flecainide.

## 2024-05-17 ENCOUNTER — NURSE ONLY (OUTPATIENT)
Dept: CARDIOLOGY CLINIC | Age: 59
End: 2024-05-17
Payer: COMMERCIAL

## 2024-05-17 DIAGNOSIS — I47.29 NSVT (NONSUSTAINED VENTRICULAR TACHYCARDIA) (HCC): Primary | ICD-10-CM

## 2024-05-17 PROCEDURE — 93000 ELECTROCARDIOGRAM COMPLETE: CPT | Performed by: INTERNAL MEDICINE

## 2024-05-21 NOTE — PROGRESS NOTES
EKG has been read by Macy POND    EKG looks good, pt will continue to take flecainide 100 BID.    Left detail VM relaying message above.

## 2024-06-12 RX ORDER — METOPROLOL SUCCINATE 25 MG/1
12.5 TABLET, EXTENDED RELEASE ORAL DAILY
Qty: 45 TABLET | Refills: 3 | Status: SHIPPED | OUTPATIENT
Start: 2024-06-12 | End: 2025-06-07

## 2024-06-12 RX ORDER — ATORVASTATIN CALCIUM 40 MG/1
40 TABLET, FILM COATED ORAL NIGHTLY
Qty: 90 TABLET | Refills: 3 | Status: SHIPPED | OUTPATIENT
Start: 2024-06-12

## 2024-06-12 NOTE — TELEPHONE ENCOUNTER
Requested Prescriptions     Pending Prescriptions Disp Refills    atorvastatin (LIPITOR) 40 MG tablet 90 tablet 3     Sig: Take 1 tablet by mouth nightly    metoprolol succinate (TOPROL XL) 25 MG extended release tablet 45 tablet 3     Sig: Take 0.5 tablets by mouth daily            Last Office Visit: 2/8/2024     Next Office Visit: 8/8/2024

## 2024-07-10 RX ORDER — ASPIRIN 81 MG
81 TABLET,CHEWABLE ORAL DAILY
Qty: 90 TABLET | Refills: 3 | Status: SHIPPED | OUTPATIENT
Start: 2024-07-10

## 2024-07-10 NOTE — TELEPHONE ENCOUNTER
Requested Prescriptions     Pending Prescriptions Disp Refills    ASPIRIN LOW DOSE 81 MG chewable tablet 90 tablet 3     Sig: Take 1 tablet by mouth daily            Checked Correct Pharmacy: Yes    Any changes since last refill? No       Last Office Visit: 2/8/2024     Next Office Visit: 8/8/2024

## 2024-07-31 NOTE — PROGRESS NOTES
(Formerly [facility])    2. PVC (premature ventricular contraction)    3. Encounter for monitoring flecainide therapy    4. Palpitations         Cardiac HX: Mario Calderon is a 58 y.o. man with a h/o depression, anxiety, CP who p/w lightheadedness, dizziness, REGALADO, palpitations, ongoing x the past year, noted to have NSVT on tele, s/p LHC (4/2021, Dr. White) no sig CAD, EF 55% per echo, placed on flecainide w/ no change in NSVT, verapamil added, no change, s/p EPS (4/2021, Dr. Bhatia), unable to ablate d/t high impedence noted during mapping, flec changed to amio, 7 day CAM (4/2021) w/ rare PVCs, no NSVT, 2-week CAM (4/2022) NSR, 8.5% PVC burden, echo (5/2022) EF 65%, 2-day CAM (/2022) <1% PVC burden, no NSVT, PFTs WNL (8/2021), LFT normal (11/2023), hyperthyroid, amio d/c'd, 6-day CAM (4/2024) w/ > 10,000 episodes of nonsustained VT with the longest lasting 5 beats and an overall PVC burden of 14%, placed on flecainide 100 mg BID.        NSVT/PVCs/palpitations  - In NSR - no arrhythmia on EKG  - Off amio d/t hyperthyroidism  - On flecainide 100 mg BID - QRS 89  - On Toprol XL 12.5 mg QD  - 6 day CAM (4//2024) w/ > 10,000 NSVT longest 5 beats, 14% PVC burden - on no meds  - Will do a 3 day CAM to assess response to flecainide  - Lifestyle modification - diet, exercise and weight loss  - F/u with Dr. Bhatia in 6-8 months  - ECG ordered and results personally reviewed      CAD  - No s/s  - Minor per cath  - On ASA, statin, BB    All questions and concerns were addressed to the patient/family. Alternatives to my treatment were discussed. The note was completed using EMR. Every effort was made to ensure accuracy; however, inadvertent computerized transcription errors may be present.     Patient received education regarding their diagnosis, treatment and medications while in the office today.      Leonides Reeves CNP  [facility]    I  have spent 41 minutes in care of the patient including direct face to face time, chart preparation,

## 2024-08-08 ENCOUNTER — NURSE ONLY (OUTPATIENT)
Dept: CARDIOLOGY CLINIC | Age: 59
End: 2024-08-08

## 2024-08-08 ENCOUNTER — OFFICE VISIT (OUTPATIENT)
Dept: CARDIOLOGY CLINIC | Age: 59
End: 2024-08-08
Payer: COMMERCIAL

## 2024-08-08 VITALS
HEART RATE: 65 BPM | BODY MASS INDEX: 30.81 KG/M2 | DIASTOLIC BLOOD PRESSURE: 70 MMHG | WEIGHT: 227.2 LBS | SYSTOLIC BLOOD PRESSURE: 118 MMHG

## 2024-08-08 DIAGNOSIS — R00.2 PALPITATIONS: ICD-10-CM

## 2024-08-08 DIAGNOSIS — Z51.81 ENCOUNTER FOR MONITORING FLECAINIDE THERAPY: ICD-10-CM

## 2024-08-08 DIAGNOSIS — I49.3 PVC (PREMATURE VENTRICULAR CONTRACTION): ICD-10-CM

## 2024-08-08 DIAGNOSIS — Z79.899 ENCOUNTER FOR MONITORING FLECAINIDE THERAPY: ICD-10-CM

## 2024-08-08 DIAGNOSIS — I47.29 NSVT (NONSUSTAINED VENTRICULAR TACHYCARDIA) (HCC): Primary | ICD-10-CM

## 2024-08-08 PROCEDURE — 93000 ELECTROCARDIOGRAM COMPLETE: CPT | Performed by: NURSE PRACTITIONER

## 2024-08-08 PROCEDURE — 99215 OFFICE O/P EST HI 40 MIN: CPT | Performed by: NURSE PRACTITIONER

## 2024-08-08 NOTE — PROGRESS NOTES
Monitor placed by MT  Monitor company CAM  Length of monitor 3 DAY   Monitor ordered by FW  Serial number/Patch ID [id number]  Activation successful prior to pt leaving office? Yes

## 2024-09-03 RX ORDER — FLECAINIDE ACETATE 100 MG/1
100 TABLET ORAL 2 TIMES DAILY
Qty: 180 TABLET | Refills: 3 | Status: SHIPPED | OUTPATIENT
Start: 2024-09-03

## 2024-09-03 NOTE — TELEPHONE ENCOUNTER
Requested Prescriptions     Pending Prescriptions Disp Refills    flecainide (TAMBOCOR) 100 MG tablet [Pharmacy Med Name: FLECAINIDE ACETATE 100 MG TAB] 180 tablet 3     Sig: TAKE 1 TABLET BY MOUTH TWICE A DAY            Checked Correct Pharmacy: Yes    Any changes since last refill? No     Number: 180    Refills: 3    Last Office Visit: 8/8/2024     Next Office Visit: 2/10/2025       Last Labs: 04.15.2024 T4 TSH

## 2024-12-20 DIAGNOSIS — E05.90 HYPERTHYROIDISM: ICD-10-CM

## 2024-12-21 LAB
T4 FREE SERPL-MCNC: 1.1 NG/DL (ref 0.9–1.8)
TSH SERPL DL<=0.005 MIU/L-ACNC: 2.53 UIU/ML (ref 0.27–4.2)

## 2025-05-29 RX ORDER — ATORVASTATIN CALCIUM 40 MG/1
40 TABLET, FILM COATED ORAL NIGHTLY
Qty: 90 TABLET | Refills: 3 | Status: SHIPPED | OUTPATIENT
Start: 2025-05-29

## 2025-05-29 NOTE — TELEPHONE ENCOUNTER
Requested Prescriptions     Pending Prescriptions Disp Refills    atorvastatin (LIPITOR) 40 MG tablet 90 tablet 3     Sig: Take 1 tablet by mouth nightly            Checked Correct Pharmacy: Yes    Any changes since last refill? No     Number: 90  Refills: 3    Last OV: 8/8/2024 Provider: MIGUEL A    Next OV: 9/18/2025 Provider: VIPUL    Last Labs:   Lipid:  Lab Results   Component Value Date    CHOL 128 11/25/2023    TRIG 61 11/25/2023    HDL 43 11/25/2023    LDL 73 11/25/2023    VLDL 12 11/25/2023       Medication List  As of 8/8/2024  2:56 PM    Aspirin 81 mg Oral DAILY    Atorvastatin Calcium 40 mg Oral NIGHTLY    Cholecalciferol 1,000 Units Oral DAILY    Flecainide Acetate 100 mg Oral 2 TIMES DAILY    Metoprolol Succinate 12.5 mg Oral DAILY    Multiple Vitamins-Minerals 1 tablet Oral DAILY    Omega-3 Fatty Acids 1,000 mg Oral DAILY    Sildenafil Citrate 100 MG Take 50mg or 100mg orally at least 30 minutes prior to sexual activity.

## 2025-06-02 RX ORDER — METOPROLOL SUCCINATE 25 MG/1
12.5 TABLET, EXTENDED RELEASE ORAL DAILY
Qty: 45 TABLET | Refills: 3 | Status: SHIPPED | OUTPATIENT
Start: 2025-06-02 | End: 2026-05-28

## 2025-06-02 NOTE — TELEPHONE ENCOUNTER
Requested Prescriptions     Pending Prescriptions Disp Refills    metoprolol succinate (TOPROL XL) 25 MG extended release tablet 45 tablet 3     Sig: Take 0.5 tablets by mouth daily            Checked Correct Pharmacy: Yes    Any changes since last refill? No     Number: 45  Refills: 3    Last OV: 8/8/2024 Provider: MIGUEL A    Next OV: 9/18/2025 Provider: VIPUL    Last Labs:   TSH:  Lab Results   Component Value Date    TSH 2.53 12/20/2024

## 2025-07-01 RX ORDER — ASPIRIN 81 MG
81 TABLET,CHEWABLE ORAL DAILY
Qty: 90 TABLET | Refills: 3 | Status: SHIPPED | OUTPATIENT
Start: 2025-07-01

## 2025-08-25 RX ORDER — FLECAINIDE ACETATE 100 MG/1
100 TABLET ORAL 2 TIMES DAILY
Qty: 180 TABLET | Refills: 3 | Status: SHIPPED | OUTPATIENT
Start: 2025-08-25

## (undated) DEVICE — SOLUTION IV IRRIG WATER 500ML POUR BRL ST 2F7113

## (undated) DEVICE — BW-412T DISP COMBO CLEANING BRUSH: Brand: SINGLE USE COMBINATION CLEANING BRUSH

## (undated) DEVICE — PROCEDURE KIT ENDOSCP CUST

## (undated) DEVICE — SET VLV 3 PC AWS DISPOSABLE GRDIAN SCOPEVALET